# Patient Record
Sex: MALE | Race: WHITE | Employment: OTHER | URBAN - METROPOLITAN AREA
[De-identification: names, ages, dates, MRNs, and addresses within clinical notes are randomized per-mention and may not be internally consistent; named-entity substitution may affect disease eponyms.]

---

## 2019-01-01 ENCOUNTER — HOSPITAL ENCOUNTER (INPATIENT)
Age: 64
LOS: 5 days | Discharge: HOME | DRG: 419 | End: 2019-01-06
Attending: EMERGENCY MEDICINE | Admitting: SURGERY
Payer: COMMERCIAL

## 2019-01-01 ENCOUNTER — APPOINTMENT (OUTPATIENT)
Dept: CT IMAGING | Age: 64
DRG: 419 | End: 2019-01-01
Attending: EMERGENCY MEDICINE
Payer: COMMERCIAL

## 2019-01-01 DIAGNOSIS — K81.9 CHOLECYSTITIS: Primary | ICD-10-CM

## 2019-01-01 DIAGNOSIS — R10.13 ABDOMINAL PAIN, EPIGASTRIC: ICD-10-CM

## 2019-01-01 PROBLEM — K80.40 CALCULUS OF BILE DUCT WITH CHOLECYSTITIS: Status: ACTIVE | Noted: 2019-01-01

## 2019-01-01 LAB
ALBUMIN SERPL-MCNC: 3.7 G/DL (ref 3.5–5)
ALBUMIN/GLOB SERPL: 1 {RATIO} (ref 1.1–2.2)
ALP SERPL-CCNC: 260 U/L (ref 45–117)
ALT SERPL-CCNC: 1015 U/L (ref 12–78)
ANION GAP SERPL CALC-SCNC: 10 MMOL/L (ref 5–15)
AST SERPL-CCNC: 625 U/L (ref 15–37)
BASOPHILS # BLD: 0 K/UL (ref 0–0.1)
BASOPHILS NFR BLD: 0 % (ref 0–1)
BILIRUB SERPL-MCNC: 7.7 MG/DL (ref 0.2–1)
BUN SERPL-MCNC: 10 MG/DL (ref 6–20)
BUN/CREAT SERPL: 10 (ref 12–20)
CALCIUM SERPL-MCNC: 9.2 MG/DL (ref 8.5–10.1)
CHLORIDE SERPL-SCNC: 98 MMOL/L (ref 97–108)
CO2 SERPL-SCNC: 28 MMOL/L (ref 21–32)
COMMENT, HOLDF: NORMAL
CREAT SERPL-MCNC: 1.03 MG/DL (ref 0.7–1.3)
DIFFERENTIAL METHOD BLD: ABNORMAL
EOSINOPHIL # BLD: 0 K/UL (ref 0–0.4)
EOSINOPHIL NFR BLD: 0 % (ref 0–7)
ERYTHROCYTE [DISTWIDTH] IN BLOOD BY AUTOMATED COUNT: 12.5 % (ref 11.5–14.5)
GLOBULIN SER CALC-MCNC: 3.8 G/DL (ref 2–4)
GLUCOSE SERPL-MCNC: 154 MG/DL (ref 65–100)
HCT VFR BLD AUTO: 51.3 % (ref 36.6–50.3)
HGB BLD-MCNC: 17.2 G/DL (ref 12.1–17)
IMM GRANULOCYTES # BLD: 0.1 K/UL (ref 0–0.04)
IMM GRANULOCYTES NFR BLD AUTO: 1 % (ref 0–0.5)
LACTATE BLD-SCNC: 1.33 MMOL/L (ref 0.4–2)
LIPASE SERPL-CCNC: 80 U/L (ref 73–393)
LYMPHOCYTES # BLD: 0.6 K/UL (ref 0.8–3.5)
LYMPHOCYTES NFR BLD: 5 % (ref 12–49)
MCH RBC QN AUTO: 28.5 PG (ref 26–34)
MCHC RBC AUTO-ENTMCNC: 33.5 G/DL (ref 30–36.5)
MCV RBC AUTO: 84.9 FL (ref 80–99)
MONOCYTES # BLD: 0.7 K/UL (ref 0–1)
MONOCYTES NFR BLD: 6 % (ref 5–13)
NEUTS SEG # BLD: 10.3 K/UL (ref 1.8–8)
NEUTS SEG NFR BLD: 88 % (ref 32–75)
NRBC # BLD: 0 K/UL (ref 0–0.01)
NRBC BLD-RTO: 0 PER 100 WBC
PLATELET # BLD AUTO: 258 K/UL (ref 150–400)
PLATELET COMMENTS,PCOM: ABNORMAL
PMV BLD AUTO: 10.5 FL (ref 8.9–12.9)
POTASSIUM SERPL-SCNC: 3.8 MMOL/L (ref 3.5–5.1)
PROT SERPL-MCNC: 7.5 G/DL (ref 6.4–8.2)
RBC # BLD AUTO: 6.04 M/UL (ref 4.1–5.7)
RBC MORPH BLD: ABNORMAL
SAMPLES BEING HELD,HOLD: NORMAL
SODIUM SERPL-SCNC: 136 MMOL/L (ref 136–145)
TROPONIN I SERPL-MCNC: <0.05 NG/ML
WBC # BLD AUTO: 11.7 K/UL (ref 4.1–11.1)

## 2019-01-01 PROCEDURE — 80053 COMPREHEN METABOLIC PANEL: CPT

## 2019-01-01 PROCEDURE — 96375 TX/PRO/DX INJ NEW DRUG ADDON: CPT

## 2019-01-01 PROCEDURE — 96374 THER/PROPH/DIAG INJ IV PUSH: CPT

## 2019-01-01 PROCEDURE — 93005 ELECTROCARDIOGRAM TRACING: CPT

## 2019-01-01 PROCEDURE — 83690 ASSAY OF LIPASE: CPT

## 2019-01-01 PROCEDURE — 74011636320 HC RX REV CODE- 636/320: Performed by: RADIOLOGY

## 2019-01-01 PROCEDURE — 74177 CT ABD & PELVIS W/CONTRAST: CPT

## 2019-01-01 PROCEDURE — 74011250636 HC RX REV CODE- 250/636: Performed by: EMERGENCY MEDICINE

## 2019-01-01 PROCEDURE — 83605 ASSAY OF LACTIC ACID: CPT

## 2019-01-01 PROCEDURE — 65270000029 HC RM PRIVATE

## 2019-01-01 PROCEDURE — 74011000258 HC RX REV CODE- 258: Performed by: RADIOLOGY

## 2019-01-01 PROCEDURE — 96361 HYDRATE IV INFUSION ADD-ON: CPT

## 2019-01-01 PROCEDURE — 85025 COMPLETE CBC W/AUTO DIFF WBC: CPT

## 2019-01-01 PROCEDURE — 84484 ASSAY OF TROPONIN QUANT: CPT

## 2019-01-01 PROCEDURE — 99285 EMERGENCY DEPT VISIT HI MDM: CPT

## 2019-01-01 RX ORDER — SODIUM CHLORIDE 0.9 % (FLUSH) 0.9 %
10 SYRINGE (ML) INJECTION
Status: COMPLETED | OUTPATIENT
Start: 2019-01-01 | End: 2019-01-01

## 2019-01-01 RX ORDER — HYDROMORPHONE HYDROCHLORIDE 2 MG/ML
1 INJECTION, SOLUTION INTRAMUSCULAR; INTRAVENOUS; SUBCUTANEOUS
Status: DISCONTINUED | OUTPATIENT
Start: 2019-01-01 | End: 2019-01-03

## 2019-01-01 RX ORDER — MORPHINE SULFATE 2 MG/ML
4 INJECTION, SOLUTION INTRAMUSCULAR; INTRAVENOUS
Status: COMPLETED | OUTPATIENT
Start: 2019-01-01 | End: 2019-01-01

## 2019-01-01 RX ORDER — ONDANSETRON 2 MG/ML
4 INJECTION INTRAMUSCULAR; INTRAVENOUS
Status: COMPLETED | OUTPATIENT
Start: 2019-01-01 | End: 2019-01-01

## 2019-01-01 RX ORDER — SODIUM CHLORIDE, SODIUM LACTATE, POTASSIUM CHLORIDE, CALCIUM CHLORIDE 600; 310; 30; 20 MG/100ML; MG/100ML; MG/100ML; MG/100ML
200 INJECTION, SOLUTION INTRAVENOUS CONTINUOUS
Status: DISCONTINUED | OUTPATIENT
Start: 2019-01-01 | End: 2019-01-04

## 2019-01-01 RX ORDER — KETOROLAC TROMETHAMINE 30 MG/ML
15 INJECTION, SOLUTION INTRAMUSCULAR; INTRAVENOUS
Status: DISPENSED | OUTPATIENT
Start: 2019-01-01 | End: 2019-01-03

## 2019-01-01 RX ORDER — ONDANSETRON 2 MG/ML
4 INJECTION INTRAMUSCULAR; INTRAVENOUS
Status: DISCONTINUED | OUTPATIENT
Start: 2019-01-01 | End: 2019-01-06 | Stop reason: HOSPADM

## 2019-01-01 RX ADMIN — IOPAMIDOL 100 ML: 755 INJECTION, SOLUTION INTRAVENOUS at 22:52

## 2019-01-01 RX ADMIN — SODIUM CHLORIDE 1000 ML: 900 INJECTION, SOLUTION INTRAVENOUS at 21:44

## 2019-01-01 RX ADMIN — SODIUM CHLORIDE 100 ML: 900 INJECTION, SOLUTION INTRAVENOUS at 22:52

## 2019-01-01 RX ADMIN — ONDANSETRON 4 MG: 2 INJECTION INTRAMUSCULAR; INTRAVENOUS at 23:23

## 2019-01-01 RX ADMIN — MORPHINE SULFATE 4 MG: 2 INJECTION, SOLUTION INTRAMUSCULAR; INTRAVENOUS at 23:23

## 2019-01-01 RX ADMIN — Medication 10 ML: at 22:52

## 2019-01-01 RX ADMIN — SODIUM CHLORIDE 1000 ML: 900 INJECTION, SOLUTION INTRAVENOUS at 23:23

## 2019-01-02 ENCOUNTER — APPOINTMENT (OUTPATIENT)
Dept: MRI IMAGING | Age: 64
DRG: 419 | End: 2019-01-02
Attending: SURGERY
Payer: COMMERCIAL

## 2019-01-02 LAB
ALBUMIN SERPL-MCNC: 3.2 G/DL (ref 3.5–5)
ALBUMIN/GLOB SERPL: 1 {RATIO} (ref 1.1–2.2)
ALP SERPL-CCNC: 221 U/L (ref 45–117)
ALT SERPL-CCNC: 864 U/L (ref 12–78)
ANION GAP SERPL CALC-SCNC: 8 MMOL/L (ref 5–15)
AST SERPL-CCNC: 445 U/L (ref 15–37)
ATRIAL RATE: 125 BPM
BILIRUB SERPL-MCNC: 7.8 MG/DL (ref 0.2–1)
BUN SERPL-MCNC: 8 MG/DL (ref 6–20)
BUN/CREAT SERPL: 8 (ref 12–20)
CALCIUM SERPL-MCNC: 8.5 MG/DL (ref 8.5–10.1)
CALCULATED P AXIS, ECG09: 38 DEGREES
CALCULATED R AXIS, ECG10: 15 DEGREES
CALCULATED T AXIS, ECG11: 46 DEGREES
CEA SERPL-MCNC: 0.4 NG/ML
CHLORIDE SERPL-SCNC: 106 MMOL/L (ref 97–108)
CO2 SERPL-SCNC: 23 MMOL/L (ref 21–32)
CREAT SERPL-MCNC: 0.98 MG/DL (ref 0.7–1.3)
DIAGNOSIS, 93000: NORMAL
ERYTHROCYTE [DISTWIDTH] IN BLOOD BY AUTOMATED COUNT: 12.5 % (ref 11.5–14.5)
GLOBULIN SER CALC-MCNC: 3.3 G/DL (ref 2–4)
GLUCOSE SERPL-MCNC: 123 MG/DL (ref 65–100)
HCT VFR BLD AUTO: 44.5 % (ref 36.6–50.3)
HGB BLD-MCNC: 15.2 G/DL (ref 12.1–17)
MCH RBC QN AUTO: 29.1 PG (ref 26–34)
MCHC RBC AUTO-ENTMCNC: 34.2 G/DL (ref 30–36.5)
MCV RBC AUTO: 85.2 FL (ref 80–99)
NRBC # BLD: 0 K/UL (ref 0–0.01)
NRBC BLD-RTO: 0 PER 100 WBC
P-R INTERVAL, ECG05: 152 MS
PLATELET # BLD AUTO: 199 K/UL (ref 150–400)
PMV BLD AUTO: 10.3 FL (ref 8.9–12.9)
POTASSIUM SERPL-SCNC: 3.7 MMOL/L (ref 3.5–5.1)
PROT SERPL-MCNC: 6.5 G/DL (ref 6.4–8.2)
Q-T INTERVAL, ECG07: 306 MS
QRS DURATION, ECG06: 86 MS
QTC CALCULATION (BEZET), ECG08: 441 MS
RBC # BLD AUTO: 5.22 M/UL (ref 4.1–5.7)
SODIUM SERPL-SCNC: 137 MMOL/L (ref 136–145)
VENTRICULAR RATE, ECG03: 125 BPM
WBC # BLD AUTO: 9.2 K/UL (ref 4.1–11.1)

## 2019-01-02 PROCEDURE — 77030021566 MRI ABD W MRCP W WO CONT

## 2019-01-02 PROCEDURE — 74011250636 HC RX REV CODE- 250/636: Performed by: SURGERY

## 2019-01-02 PROCEDURE — 85027 COMPLETE CBC AUTOMATED: CPT

## 2019-01-02 PROCEDURE — 80053 COMPREHEN METABOLIC PANEL: CPT

## 2019-01-02 PROCEDURE — A9585 GADOBUTROL INJECTION: HCPCS | Performed by: SURGERY

## 2019-01-02 PROCEDURE — 65270000032 HC RM SEMIPRIVATE

## 2019-01-02 PROCEDURE — 36415 COLL VENOUS BLD VENIPUNCTURE: CPT

## 2019-01-02 PROCEDURE — 86301 IMMUNOASSAY TUMOR CA 19-9: CPT

## 2019-01-02 PROCEDURE — 82378 CARCINOEMBRYONIC ANTIGEN: CPT

## 2019-01-02 PROCEDURE — 74011000258 HC RX REV CODE- 258: Performed by: SURGERY

## 2019-01-02 RX ORDER — CLONIDINE HYDROCHLORIDE 0.2 MG/1
0.2 TABLET ORAL
Status: DISCONTINUED | OUTPATIENT
Start: 2019-01-02 | End: 2019-01-06 | Stop reason: HOSPADM

## 2019-01-02 RX ADMIN — PIPERACILLIN AND TAZOBACTAM 3.38 G: 3; .375 INJECTION, POWDER, FOR SOLUTION INTRAVENOUS at 17:36

## 2019-01-02 RX ADMIN — SODIUM CHLORIDE, SODIUM LACTATE, POTASSIUM CHLORIDE, AND CALCIUM CHLORIDE 125 ML/HR: 600; 310; 30; 20 INJECTION, SOLUTION INTRAVENOUS at 01:50

## 2019-01-02 RX ADMIN — KETOROLAC TROMETHAMINE 15 MG: 30 INJECTION, SOLUTION INTRAMUSCULAR at 01:51

## 2019-01-02 RX ADMIN — GADOBUTROL 10 ML: 604.72 INJECTION INTRAVENOUS at 19:02

## 2019-01-02 RX ADMIN — PIPERACILLIN AND TAZOBACTAM 3.38 G: 3; .375 INJECTION, POWDER, FOR SOLUTION INTRAVENOUS at 02:00

## 2019-01-02 RX ADMIN — HYDROMORPHONE HYDROCHLORIDE 1 MG: 2 INJECTION INTRAMUSCULAR; INTRAVENOUS; SUBCUTANEOUS at 23:58

## 2019-01-02 RX ADMIN — PIPERACILLIN AND TAZOBACTAM 3.38 G: 3; .375 INJECTION, POWDER, FOR SOLUTION INTRAVENOUS at 09:10

## 2019-01-02 NOTE — PROGRESS NOTES
Primary Nurse Genoveva Qureshi RN and Marlen Rudolph RN performed a dual skin assessment on this patient No impairment noted Ernesto score is 23

## 2019-01-02 NOTE — ED NOTES
Bedside and Verbal shift change report given to [place], RN (oncoming nurse) by Jarrett Clifton RN (offgoing nurse). Report included the following information SBAR, ED Summary, Intake/Output, MAR and Recent Results.

## 2019-01-02 NOTE — ED PROVIDER NOTES
66 y.o. male with past medical history significant for HTN who presents from home with chief complaint of abdominal pain. Pt reports intermittent 8/10 \"stabbing\" epigastric abdominal pain for 6 days accompanied by occasional \"burning\" mid-abdominal pain, both of which are worsened by eating. Pt also states he had an episode of dry heaves today. He notes he had gall bladder problems ~20 years ago, but denies prior cholecystectomy. Pt last ate an english muffin this morning and has drank 8 glasses of water through the day today. NKDA. Pt denies back pain and flank pain. There are no other acute medical concerns at this time. Note written by Rohan Hernandez, as dictated by Chastity Bustillos MD 9:48 PM 
 
 
 
  
 
Past Medical History:  
Diagnosis Date  Hypertension History reviewed. No pertinent surgical history. History reviewed. No pertinent family history. Social History Socioeconomic History  Marital status:  Spouse name: Not on file  Number of children: Not on file  Years of education: Not on file  Highest education level: Not on file Social Needs  Financial resource strain: Not on file  Food insecurity - worry: Not on file  Food insecurity - inability: Not on file  Transportation needs - medical: Not on file  Transportation needs - non-medical: Not on file Occupational History  Not on file Tobacco Use  Smoking status: Never Smoker  Smokeless tobacco: Never Used Substance and Sexual Activity  Alcohol use: Yes Comment: 5 times a week  Drug use: No  
 Sexual activity: Not on file Other Topics Concern  Not on file Social History Narrative  Not on file ALLERGIES: Patient has no known allergies. Review of Systems Respiratory: Negative for cough and shortness of breath. Cardiovascular: Negative for chest pain and palpitations. Gastrointestinal: Positive for abdominal pain and nausea. Negative for vomiting. Genitourinary: Negative for flank pain and hematuria. Musculoskeletal: Negative for back pain and neck pain. All other systems reviewed and are negative. Vitals:  
 01/01/19 2046 01/01/19 2137 BP:  (!) 182/101 Pulse: (!) 130 (!) 129 Resp:  25 Temp:  99.8 °F (37.7 °C) SpO2: 99% 94% Weight:  99.8 kg (220 lb) Height:  5' 10\" (1.778 m) Physical Exam  
Constitutional: He is oriented to person, place, and time. He appears well-developed and well-nourished. He appears ill. HENT:  
Head: Normocephalic and atraumatic. Eyes: Conjunctivae and EOM are normal. Pupils are equal, round, and reactive to light. No scleral icterus. Neck: Normal range of motion. Cardiovascular: Regular rhythm, normal heart sounds and intact distal pulses. Tachycardia present. No murmur heard. Pulmonary/Chest: Effort normal and breath sounds normal. No stridor. No respiratory distress. Abdominal: Soft. Bowel sounds are normal. There is tenderness in the right upper quadrant and epigastric area. There is no rebound and negative Main's sign. Musculoskeletal: Normal range of motion. He exhibits no edema or tenderness. Neurological: He is alert and oriented to person, place, and time. No cranial nerve deficit. Skin: Skin is warm and dry. He is not diaphoretic. Psychiatric: He has a normal mood and affect. Nursing note and vitals reviewed. MDM Number of Diagnoses or Management Options Abdominal pain, epigastric:  
Cholecystitis:  
Diagnosis management comments: Patient with upper abdominal pain - check labs and abdominal CT scan for possible pancreatitis vs diverticular disease vs other intra-abdominal process. Amount and/or Complexity of Data Reviewed Clinical lab tests: ordered and reviewed Tests in the radiology section of CPT®: ordered and reviewed Discuss the patient with other providers: yes (1145pm - General Surgery to admit for further eval and care) Procedures ED EKG interpretation: 
Rhythm: sinus tachycardia; Rate (approx.): 125; Axis: normal; ST/T wave: non-specific changes; Note written by Rohan Todd, as dictated by Ally Caldwell MD 9:05 PM 
 
 
 
  
 
 
VITALS:  
Patient Vitals for the past 8 hrs: 
 Temp Pulse Resp BP SpO2  
01/01/19 2137 99.8 °F (37.7 °C) (!) 129 25 (!) 182/101 94 % 01/01/19 2046  (!) 130   99 % Recent Results (from the past 24 hour(s)) EKG, 12 LEAD, INITIAL Collection Time: 01/01/19  9:03 PM  
Result Value Ref Range Ventricular Rate 125 BPM  
 Atrial Rate 125 BPM  
 P-R Interval 152 ms QRS Duration 86 ms  
 Q-T Interval 306 ms QTC Calculation (Bezet) 441 ms Calculated P Axis 38 degrees Calculated R Axis 15 degrees Calculated T Axis 46 degrees Diagnosis Sinus tachycardia Nonspecific ST abnormality No previous ECGs available SAMPLES BEING HELD Collection Time: 01/01/19  9:10 PM  
Result Value Ref Range SAMPLES BEING HELD 1red,1blu,1lav,1pst   
 COMMENT Add-on orders for these samples will be processed based on acceptable specimen integrity and analyte stability, which may vary by analyte. CBC WITH AUTOMATED DIFF Collection Time: 01/01/19  9:10 PM  
Result Value Ref Range WBC 11.7 (H) 4.1 - 11.1 K/uL  
 RBC 6.04 (H) 4.10 - 5.70 M/uL  
 HGB 17.2 (H) 12.1 - 17.0 g/dL HCT 51.3 (H) 36.6 - 50.3 % MCV 84.9 80.0 - 99.0 FL  
 MCH 28.5 26.0 - 34.0 PG  
 MCHC 33.5 30.0 - 36.5 g/dL  
 RDW 12.5 11.5 - 14.5 % PLATELET 101 550 - 879 K/uL MPV 10.5 8.9 - 12.9 FL  
 NRBC 0.0 0  WBC ABSOLUTE NRBC 0.00 0.00 - 0.01 K/uL NEUTROPHILS 88 (H) 32 - 75 % LYMPHOCYTES 5 (L) 12 - 49 % MONOCYTES 6 5 - 13 % EOSINOPHILS 0 0 - 7 % BASOPHILS 0 0 - 1 % IMMATURE GRANULOCYTES 1 (H) 0.0 - 0.5 % ABS. NEUTROPHILS 10.3 (H) 1.8 - 8.0 K/UL  
 ABS. LYMPHOCYTES 0.6 (L) 0.8 - 3.5 K/UL  
 ABS. MONOCYTES 0.7 0.0 - 1.0 K/UL  
 ABS. EOSINOPHILS 0.0 0.0 - 0.4 K/UL  
 ABS. BASOPHILS 0.0 0.0 - 0.1 K/UL  
 ABS. IMM. GRANS. 0.1 (H) 0.00 - 0.04 K/UL  
 DF SMEAR SCANNED    
 PLATELET COMMENTS Large Platelets RBC COMMENTS NORMOCYTIC, NORMOCHROMIC METABOLIC PANEL, COMPREHENSIVE Collection Time: 01/01/19  9:10 PM  
Result Value Ref Range Sodium 136 136 - 145 mmol/L Potassium 3.8 3.5 - 5.1 mmol/L Chloride 98 97 - 108 mmol/L  
 CO2 28 21 - 32 mmol/L Anion gap 10 5 - 15 mmol/L Glucose 154 (H) 65 - 100 mg/dL BUN 10 6 - 20 MG/DL Creatinine 1.03 0.70 - 1.30 MG/DL  
 BUN/Creatinine ratio 10 (L) 12 - 20 GFR est AA >60 >60 ml/min/1.73m2 GFR est non-AA >60 >60 ml/min/1.73m2 Calcium 9.2 8.5 - 10.1 MG/DL Bilirubin, total 7.7 (H) 0.2 - 1.0 MG/DL  
 ALT (SGPT) 1,015 (H) 12 - 78 U/L  
 AST (SGOT) 625 (H) 15 - 37 U/L Alk. phosphatase 260 (H) 45 - 117 U/L Protein, total 7.5 6.4 - 8.2 g/dL Albumin 3.7 3.5 - 5.0 g/dL Globulin 3.8 2.0 - 4.0 g/dL A-G Ratio 1.0 (L) 1.1 - 2.2 LIPASE Collection Time: 01/01/19  9:10 PM  
Result Value Ref Range Lipase 80 73 - 393 U/L  
TROPONIN I Collection Time: 01/01/19  9:10 PM  
Result Value Ref Range Troponin-I, Qt. <0.05 <0.05 ng/mL POC LACTIC ACID Collection Time: 01/01/19 10:07 PM  
Result Value Ref Range Lactic Acid (POC) 1.33 0.40 - 2.00 mmol/L Ct Abd Pelv W Cont Result Date: 1/1/2019 EXAM: CT ABD PELV W CONT INDICATION: Upper abdominal pain for 6 days is exacerbated by eating. Mild leukocytosis. Elevated alkaline phosphatase, AST, SGPT, and serum bilirubin. Normal lipase. No abdominal surgery. COMPARISON: None. CONTRAST: 100 mL of Isovue-370. TECHNIQUE: Following the uneventful intravenous administration of contrast, thin axial images were obtained through the abdomen and pelvis.  Coronal and sagittal reconstructions were generated. Oral contrast was not administered. CT dose reduction was achieved through use of a standardized protocol tailored for this examination and automatic exposure control for dose modulation. Adaptive statistical iterative reconstruction (ASIR) was utilized. FINDINGS: LUNG BASES: Right lung base subsegmental atelectasis. INCIDENTALLY IMAGED HEART AND MEDIASTINUM: Unremarkable. LIVER: Heterogeneous hypoattenuation most likely represents hepatic steatosis. Smooth surface. Normal size. GALLBLADDER: Incomplete distention. Heterogeneous mural thickening. Proximal CBD measures 13 mm. Distal CBD measures 8 mm. Subtle mural thickening of the wall of the CBD. Ill-defined ampulla and distal CBD. SPLEEN: Spleen measures 14 cm. No mass. PANCREAS: Mild atrophy. No inflammation or mass. Pancreatic duct is not dilated. ADRENALS: Unremarkable. KIDNEYS: Small bilateral nonspecific cysts. No solid renal mass or hydronephrosis. STOMACH: Nondistended. SMALL BOWEL: No dilatation or wall thickening. COLON: No dilatation or wall thickening. APPENDIX: Unremarkable. PERITONEUM: No ascites or pneumoperitoneum. RETROPERITONEUM: No lymphadenopathy or aortic aneurysm. REPRODUCTIVE ORGANS: Heterogeneous mild prostatomegaly contains calcifications. URINARY BLADDER: No mass or calculus. BONES: No destructive bone lesion. ADDITIONAL COMMENTS: Right inguinal hernia contains fat. Small left inguinal hernia contains fat. IMPRESSION: 1. Despite nondistention, there is likely acute cholecystitis. CBD dilatation and elevated serum bilirubin suggest choledocholithiasis. No pancreatic dilatation to suggest ampullary mass. Consider ERCP or MRCP. 2. Right greater than left fat-containing inguinal hernias. 3. Probable hepatic steatosis. Nonspecific splenomegaly.

## 2019-01-02 NOTE — CONSULTS
Chief Complaint:  Abdominal pain with jaundice    HPI:  67 yo man with hx HTN presented to ED with abdominal pain. Pt reported pain developed 6 days ago. Pain has been in epigastric radiating toward umbilicus. Pt reported nausea/vomiting and dry heaves. He denied any fever or chills. Pt did not dark color urine. He reported about 10 pounds weight loss over past 6 days but attributed it to nausea/vomiting and unable to eat. CT scan showed dilated bile duct likely from gallstones. Past Medical History:   Diagnosis Date    Hypertension        History reviewed. No pertinent surgical history. No current facility-administered medications on file prior to encounter. No current outpatient medications on file prior to encounter.        No Known Allergies    Review of Systems - General ROS: negative  Psychological ROS: negative  Respiratory ROS: negative  Cardiovascular ROS: negative  Gastrointestinal ROS: positive for - abdominal pain and nausea/vomiting    Visit Vitals  /72 (BP 1 Location: Right arm, BP Patient Position: At rest)   Pulse 99   Temp 100.1 °F (37.8 °C)   Resp 23   Ht 5' 10\" (1.778 m)   Wt 220 lb (99.8 kg)   SpO2 93%   BMI 31.57 kg/m²         Physical Exam:    Gen:  NAD  Pulm:  Unlabored  Abd:  S/moderately distended/mild TTP in epigastric area    Recent Results (from the past 24 hour(s))   EKG, 12 LEAD, INITIAL    Collection Time: 01/01/19  9:03 PM   Result Value Ref Range    Ventricular Rate 125 BPM    Atrial Rate 125 BPM    P-R Interval 152 ms    QRS Duration 86 ms    Q-T Interval 306 ms    QTC Calculation (Bezet) 441 ms    Calculated P Axis 38 degrees    Calculated R Axis 15 degrees    Calculated T Axis 46 degrees    Diagnosis       Sinus tachycardia  Nonspecific ST abnormality  No previous ECGs available     SAMPLES BEING HELD    Collection Time: 01/01/19  9:10 PM   Result Value Ref Range    SAMPLES BEING HELD 1red,1blu,1lav,1pst     COMMENT        Add-on orders for these samples will be processed based on acceptable specimen integrity and analyte stability, which may vary by analyte. CBC WITH AUTOMATED DIFF    Collection Time: 01/01/19  9:10 PM   Result Value Ref Range    WBC 11.7 (H) 4.1 - 11.1 K/uL    RBC 6.04 (H) 4.10 - 5.70 M/uL    HGB 17.2 (H) 12.1 - 17.0 g/dL    HCT 51.3 (H) 36.6 - 50.3 %    MCV 84.9 80.0 - 99.0 FL    MCH 28.5 26.0 - 34.0 PG    MCHC 33.5 30.0 - 36.5 g/dL    RDW 12.5 11.5 - 14.5 %    PLATELET 123 574 - 021 K/uL    MPV 10.5 8.9 - 12.9 FL    NRBC 0.0 0  WBC    ABSOLUTE NRBC 0.00 0.00 - 0.01 K/uL    NEUTROPHILS 88 (H) 32 - 75 %    LYMPHOCYTES 5 (L) 12 - 49 %    MONOCYTES 6 5 - 13 %    EOSINOPHILS 0 0 - 7 %    BASOPHILS 0 0 - 1 %    IMMATURE GRANULOCYTES 1 (H) 0.0 - 0.5 %    ABS. NEUTROPHILS 10.3 (H) 1.8 - 8.0 K/UL    ABS. LYMPHOCYTES 0.6 (L) 0.8 - 3.5 K/UL    ABS. MONOCYTES 0.7 0.0 - 1.0 K/UL    ABS. EOSINOPHILS 0.0 0.0 - 0.4 K/UL    ABS. BASOPHILS 0.0 0.0 - 0.1 K/UL    ABS. IMM. GRANS. 0.1 (H) 0.00 - 0.04 K/UL    DF SMEAR SCANNED      PLATELET COMMENTS Large Platelets      RBC COMMENTS NORMOCYTIC, NORMOCHROMIC     METABOLIC PANEL, COMPREHENSIVE    Collection Time: 01/01/19  9:10 PM   Result Value Ref Range    Sodium 136 136 - 145 mmol/L    Potassium 3.8 3.5 - 5.1 mmol/L    Chloride 98 97 - 108 mmol/L    CO2 28 21 - 32 mmol/L    Anion gap 10 5 - 15 mmol/L    Glucose 154 (H) 65 - 100 mg/dL    BUN 10 6 - 20 MG/DL    Creatinine 1.03 0.70 - 1.30 MG/DL    BUN/Creatinine ratio 10 (L) 12 - 20      GFR est AA >60 >60 ml/min/1.73m2    GFR est non-AA >60 >60 ml/min/1.73m2    Calcium 9.2 8.5 - 10.1 MG/DL    Bilirubin, total 7.7 (H) 0.2 - 1.0 MG/DL    ALT (SGPT) 1,015 (H) 12 - 78 U/L    AST (SGOT) 625 (H) 15 - 37 U/L    Alk.  phosphatase 260 (H) 45 - 117 U/L    Protein, total 7.5 6.4 - 8.2 g/dL    Albumin 3.7 3.5 - 5.0 g/dL    Globulin 3.8 2.0 - 4.0 g/dL    A-G Ratio 1.0 (L) 1.1 - 2.2     LIPASE    Collection Time: 01/01/19  9:10 PM   Result Value Ref Range    Lipase 80 73 - 393 U/L   TROPONIN I    Collection Time: 01/01/19  9:10 PM   Result Value Ref Range    Troponin-I, Qt. <0.05 <0.05 ng/mL   POC LACTIC ACID    Collection Time: 01/01/19 10:07 PM   Result Value Ref Range    Lactic Acid (POC) 1.33 0.40 - 2.00 mmol/L       AP:  67 yo man with biliary obstruction presumably from gallstone    - Biliary obstruction:  Will obtain MRCP to evaluate for gallstone versus mass causing obstruction  - Clear liquids  - MRCP in am  - Zosyn antibiotic  - Further plans pending MRCP

## 2019-01-02 NOTE — PROGRESS NOTES
General Surgery Daily Progress Note Admit Date: 2019 Post-Operative Day: * No surgery found * from * No surgery found * Subjective:  
 
Last 24 hrs: Pt is doing well w/o c/o pain. He was last dosed w/ pain med at 2am.  He's amazed he isn't having pain. Not OOB much Objective:  
 
Blood pressure 149/88, pulse 90, temperature 98.4 °F (36.9 °C), resp. rate 20, height 5' 10\" (1.778 m), weight 220 lb (99.8 kg), SpO2 90 %. Temp (24hrs), Av.4 °F (37.4 °C), Min:98.4 °F (36.9 °C), Max:100.1 °F (37.8 °C) 
 
 
_____________________ Physical Exam:    
Alert and Oriented, x3, in no acute distress. Cardiovascular: RRR, no peripheral edema Abdomen: soft, nl BS, NT Assessment:  
Active Problems: 
  Calculus of bile duct with cholecystitis (2019) Plan: MRCP this am 
Surgical plans therafter Keep NPO Cont zosyn Data Review: 
 
Recent Labs 197 19 WBC 9.2 11.7* HGB 15.2 17.2* HCT 44.5 51.3*  
 258 Recent Labs 19 
0447 19  136  
K 3.7 3.8  98 CO2 23 28 * 154* BUN 8 10 CREA 0.98 1.03  
CA 8.5 9.2 ALB 3.2* 3.7 SGOT 445* 625* * 1,015* Recent Labs 190 LPSE 80  
 
 
 
 
______________________ Medications: 
 
Current Facility-Administered Medications Medication Dose Route Frequency  cloNIDine HCl (CATAPRES) tablet 0.2 mg  0.2 mg Oral Q4H PRN  
 lactated Ringers infusion  125 mL/hr IntraVENous CONTINUOUS  
 ondansetron (ZOFRAN) injection 4 mg  4 mg IntraVENous Q4H PRN  
 HYDROmorphone (DILAUDID) injection 1 mg  1 mg IntraVENous Q3H PRN  piperacillin-tazobactam (ZOSYN) 3.375 g in 0.9% sodium chloride (MBP/ADV) 100 mL  3.375 g IntraVENous Q8H  
 ketorolac (TORADOL) injection 15 mg  15 mg IntraVENous Q6H PRN Mallorie Nieto, NP 
2019 ADDENDUM:  Aramis Padilla MD 
Pt seen and examined. He denied any abdominal pain, nausea or vomiting. Awaiting MRCP. If stone causing biliary obstruction then will ask GI consult for ERCP. NPO till after MRCP.   Labs in am

## 2019-01-02 NOTE — ED TRIAGE NOTES
Pt arrives via personal vehicle from home for c/o stabbing upper abd pain that started about 6 days ago. Pt reports symptoms are worse when he eats. +dry heaving.

## 2019-01-02 NOTE — ROUTINE PROCESS
TRANSFER - OUT REPORT: 
 
Verbal report given to Rica Booker RN(name) on [employer] [facility]  being transferred to [address] [place] [address](unit) for routine progression of care Report consisted of patients Situation, Background, Assessment and  
Recommendations(SBAR). Information from the following report(s) SBAR, ED Summary, Intake/Output, MAR, Recent Results and Cardiac Rhythm NSR was reviewed with the receiving nurse. Lines:  
Peripheral IV 01/01/19 Left Antecubital (Active) Site Assessment Clean, dry, & intact 1/1/2019  9:08 PM  
Phlebitis Assessment 0 1/1/2019  9:08 PM  
Infiltration Assessment 0 1/1/2019  9:08 PM  
Dressing Status Clean, dry, & intact 1/1/2019  9:08 PM  
Dressing Type Transparent 1/1/2019  9:08 PM  
Hub Color/Line Status Pink;Capped;Flushed;Patent 1/1/2019  9:08 PM  
Action Taken Blood drawn 1/1/2019  9:08 PM  
  
 
Opportunity for questions and clarification was provided. Patient transported with: 
 transport

## 2019-01-02 NOTE — PROGRESS NOTES
Reason for Admission:   Calculus of the bile duct with cholecystitis RRAT Score:       3 Plan for utilizing home health:   N/A Likelihood of Readmission:   Low Transition of Care Plan: 
CM met with patient. Patient lives with his wife. They have 2 adult daughters out of the household. Patient reports good family /social support. Patient was ambulatory prior to admission and expects return to independent functioning. Patient confirmed health insurance and prescription coverage. Transport at discharge will be provided by family. Patient confirmed that he does not have a PCP. CM provided the physician list for the [address] [place] [address] as means of facilitating patient selection of a PCP. Patient presently anticipates MCRP test to be followed by a determination regarding need for surgery. CM to follow for discharge planning needs.

## 2019-01-02 NOTE — H&P
Chief Complaint:  Abdominal pain with jaundice HPI:  67 yo man with hx HTN presented to ED with abdominal pain. Pt reported pain developed 6 days ago. Pain has been in epigastric radiating toward umbilicus. Pt reported nausea/vomiting and dry heaves. He denied any fever or chills. Pt did not dark color urine. He reported about 10 pounds weight loss over past 6 days but attributed it to nausea/vomiting and unable to eat. CT scan showed dilated bile duct likely from gallstones. Past Medical History:  
Diagnosis Date  Hypertension History reviewed. No pertinent surgical history. No current facility-administered medications on file prior to encounter. No current outpatient medications on file prior to encounter. No Known Allergies Review of Systems - General ROS: negative Psychological ROS: negative Respiratory ROS: negative Cardiovascular ROS: negative Gastrointestinal ROS: positive for - abdominal pain and nausea/vomiting Visit Vitals /90 (BP 1 Location: Right arm, BP Patient Position: At rest) Pulse (!) 109 Temp 99.1 °F (37.3 °C) Resp 20 Ht 5' 10\" (1.778 m) Wt 220 lb (99.8 kg) SpO2 92% BMI 31.57 kg/m² Physical Exam:   
Gen:  NAD Pulm:  Unlabored Abd:  S/moderately distended/mild TTP in epigastric area Recent Results (from the past 24 hour(s)) EKG, 12 LEAD, INITIAL Collection Time: 01/01/19  9:03 PM  
Result Value Ref Range Ventricular Rate 125 BPM  
 Atrial Rate 125 BPM  
 P-R Interval 152 ms QRS Duration 86 ms  
 Q-T Interval 306 ms QTC Calculation (Bezet) 441 ms Calculated P Axis 38 degrees Calculated R Axis 15 degrees Calculated T Axis 46 degrees Diagnosis Sinus tachycardia Nonspecific ST abnormality No previous ECGs available SAMPLES BEING HELD Collection Time: 01/01/19  9:10 PM  
Result Value Ref Range SAMPLES BEING HELD 1red,1blu,1lav,1pst   
 COMMENT Add-on orders for these samples will be processed based on acceptable specimen integrity and analyte stability, which may vary by analyte. CBC WITH AUTOMATED DIFF Collection Time: 01/01/19  9:10 PM  
Result Value Ref Range WBC 11.7 (H) 4.1 - 11.1 K/uL  
 RBC 6.04 (H) 4.10 - 5.70 M/uL  
 HGB 17.2 (H) 12.1 - 17.0 g/dL HCT 51.3 (H) 36.6 - 50.3 % MCV 84.9 80.0 - 99.0 FL  
 MCH 28.5 26.0 - 34.0 PG  
 MCHC 33.5 30.0 - 36.5 g/dL  
 RDW 12.5 11.5 - 14.5 % PLATELET 675 101 - 464 K/uL MPV 10.5 8.9 - 12.9 FL  
 NRBC 0.0 0  WBC ABSOLUTE NRBC 0.00 0.00 - 0.01 K/uL NEUTROPHILS 88 (H) 32 - 75 % LYMPHOCYTES 5 (L) 12 - 49 % MONOCYTES 6 5 - 13 % EOSINOPHILS 0 0 - 7 % BASOPHILS 0 0 - 1 % IMMATURE GRANULOCYTES 1 (H) 0.0 - 0.5 % ABS. NEUTROPHILS 10.3 (H) 1.8 - 8.0 K/UL  
 ABS. LYMPHOCYTES 0.6 (L) 0.8 - 3.5 K/UL  
 ABS. MONOCYTES 0.7 0.0 - 1.0 K/UL  
 ABS. EOSINOPHILS 0.0 0.0 - 0.4 K/UL  
 ABS. BASOPHILS 0.0 0.0 - 0.1 K/UL  
 ABS. IMM. GRANS. 0.1 (H) 0.00 - 0.04 K/UL  
 DF SMEAR SCANNED    
 PLATELET COMMENTS Large Platelets RBC COMMENTS NORMOCYTIC, NORMOCHROMIC METABOLIC PANEL, COMPREHENSIVE Collection Time: 01/01/19  9:10 PM  
Result Value Ref Range Sodium 136 136 - 145 mmol/L Potassium 3.8 3.5 - 5.1 mmol/L Chloride 98 97 - 108 mmol/L  
 CO2 28 21 - 32 mmol/L Anion gap 10 5 - 15 mmol/L Glucose 154 (H) 65 - 100 mg/dL BUN 10 6 - 20 MG/DL Creatinine 1.03 0.70 - 1.30 MG/DL  
 BUN/Creatinine ratio 10 (L) 12 - 20 GFR est AA >60 >60 ml/min/1.73m2 GFR est non-AA >60 >60 ml/min/1.73m2 Calcium 9.2 8.5 - 10.1 MG/DL Bilirubin, total 7.7 (H) 0.2 - 1.0 MG/DL  
 ALT (SGPT) 1,015 (H) 12 - 78 U/L  
 AST (SGOT) 625 (H) 15 - 37 U/L Alk. phosphatase 260 (H) 45 - 117 U/L Protein, total 7.5 6.4 - 8.2 g/dL Albumin 3.7 3.5 - 5.0 g/dL Globulin 3.8 2.0 - 4.0 g/dL A-G Ratio 1.0 (L) 1.1 - 2.2 LIPASE  Collection Time: 01/01/19  9:10 PM  
 Result Value Ref Range Lipase 80 73 - 393 U/L  
TROPONIN I Collection Time: 01/01/19  9:10 PM  
Result Value Ref Range Troponin-I, Qt. <0.05 <0.05 ng/mL POC LACTIC ACID Collection Time: 01/01/19 10:07 PM  
Result Value Ref Range Lactic Acid (POC) 1.33 0.40 - 2.00 mmol/L  
 
 
AP:  67 yo man with biliary obstruction presumably from gallstone - Biliary obstruction:  Will obtain MRCP to evaluate for gallstone versus mass causing obstruction - Clear liquids - MRCP in am 
- Zosyn antibiotic - Further plans pending MRCP

## 2019-01-03 ENCOUNTER — ANESTHESIA (OUTPATIENT)
Dept: ENDOSCOPY | Age: 64
DRG: 419 | End: 2019-01-03
Payer: COMMERCIAL

## 2019-01-03 ENCOUNTER — ANESTHESIA EVENT (OUTPATIENT)
Dept: ENDOSCOPY | Age: 64
DRG: 419 | End: 2019-01-03
Payer: COMMERCIAL

## 2019-01-03 ENCOUNTER — APPOINTMENT (OUTPATIENT)
Dept: GENERAL RADIOLOGY | Age: 64
DRG: 419 | End: 2019-01-03
Attending: INTERNAL MEDICINE
Payer: COMMERCIAL

## 2019-01-03 LAB
ALBUMIN SERPL-MCNC: 2.8 G/DL (ref 3.5–5)
ALBUMIN/GLOB SERPL: 0.9 {RATIO} (ref 1.1–2.2)
ALP SERPL-CCNC: 196 U/L (ref 45–117)
ALT SERPL-CCNC: 671 U/L (ref 12–78)
ANION GAP SERPL CALC-SCNC: 7 MMOL/L (ref 5–15)
AST SERPL-CCNC: 261 U/L (ref 15–37)
BILIRUB SERPL-MCNC: 4.9 MG/DL (ref 0.2–1)
BUN SERPL-MCNC: 10 MG/DL (ref 6–20)
BUN/CREAT SERPL: 12 (ref 12–20)
CALCIUM SERPL-MCNC: 8.8 MG/DL (ref 8.5–10.1)
CANCER AG19-9 SERPL-ACNC: 73 U/ML (ref 0–35)
CHLORIDE SERPL-SCNC: 106 MMOL/L (ref 97–108)
CO2 SERPL-SCNC: 26 MMOL/L (ref 21–32)
CREAT SERPL-MCNC: 0.82 MG/DL (ref 0.7–1.3)
ERYTHROCYTE [DISTWIDTH] IN BLOOD BY AUTOMATED COUNT: 12.6 % (ref 11.5–14.5)
GLOBULIN SER CALC-MCNC: 3.1 G/DL (ref 2–4)
GLUCOSE SERPL-MCNC: 87 MG/DL (ref 65–100)
HCT VFR BLD AUTO: 41.6 % (ref 36.6–50.3)
HGB BLD-MCNC: 14.1 G/DL (ref 12.1–17)
LIPASE SERPL-CCNC: 111 U/L (ref 73–393)
MCH RBC QN AUTO: 29 PG (ref 26–34)
MCHC RBC AUTO-ENTMCNC: 33.9 G/DL (ref 30–36.5)
MCV RBC AUTO: 85.4 FL (ref 80–99)
NRBC # BLD: 0 K/UL (ref 0–0.01)
NRBC BLD-RTO: 0 PER 100 WBC
PLATELET # BLD AUTO: 173 K/UL (ref 150–400)
PMV BLD AUTO: 10.5 FL (ref 8.9–12.9)
POTASSIUM SERPL-SCNC: 3.8 MMOL/L (ref 3.5–5.1)
PROT SERPL-MCNC: 5.9 G/DL (ref 6.4–8.2)
RBC # BLD AUTO: 4.87 M/UL (ref 4.1–5.7)
SODIUM SERPL-SCNC: 139 MMOL/L (ref 136–145)
WBC # BLD AUTO: 3.9 K/UL (ref 4.1–11.1)

## 2019-01-03 PROCEDURE — 77030012596 HC SPHNTOM BILI BSC -E: Performed by: INTERNAL MEDICINE

## 2019-01-03 PROCEDURE — 65270000032 HC RM SEMIPRIVATE

## 2019-01-03 PROCEDURE — 76060000033 HC ANESTHESIA 1 TO 1.5 HR: Performed by: INTERNAL MEDICINE

## 2019-01-03 PROCEDURE — 0FD98ZX EXTRACTION OF COMMON BILE DUCT, VIA NATURAL OR ARTIFICIAL OPENING ENDOSCOPIC, DIAGNOSTIC: ICD-10-PCS | Performed by: INTERNAL MEDICINE

## 2019-01-03 PROCEDURE — 77030006969 HC BSKT BILI RTVR BSC -D: Performed by: INTERNAL MEDICINE

## 2019-01-03 PROCEDURE — 74011250637 HC RX REV CODE- 250/637: Performed by: SURGERY

## 2019-01-03 PROCEDURE — 74011636320 HC RX REV CODE- 636/320

## 2019-01-03 PROCEDURE — 76040000008: Performed by: INTERNAL MEDICINE

## 2019-01-03 PROCEDURE — 0FC98ZZ EXTIRPATION OF MATTER FROM COMMON BILE DUCT, VIA NATURAL OR ARTIFICIAL OPENING ENDOSCOPIC: ICD-10-PCS | Performed by: INTERNAL MEDICINE

## 2019-01-03 PROCEDURE — 77030007288 HC DEV LOK BILI BSC -A: Performed by: INTERNAL MEDICINE

## 2019-01-03 PROCEDURE — 85027 COMPLETE CBC AUTOMATED: CPT

## 2019-01-03 PROCEDURE — 83690 ASSAY OF LIPASE: CPT

## 2019-01-03 PROCEDURE — 74011250636 HC RX REV CODE- 250/636

## 2019-01-03 PROCEDURE — 74011000258 HC RX REV CODE- 258: Performed by: SURGERY

## 2019-01-03 PROCEDURE — 88112 CYTOPATH CELL ENHANCE TECH: CPT

## 2019-01-03 PROCEDURE — 80053 COMPREHEN METABOLIC PANEL: CPT

## 2019-01-03 PROCEDURE — 77030008684 HC TU ET CUF COVD -B: Performed by: ANESTHESIOLOGY

## 2019-01-03 PROCEDURE — 77030009038 HC CATH BILI STN RTVR BSC -C: Performed by: INTERNAL MEDICINE

## 2019-01-03 PROCEDURE — 77030026438 HC STYL ET INTUB CARD -A: Performed by: ANESTHESIOLOGY

## 2019-01-03 PROCEDURE — 36415 COLL VENOUS BLD VENIPUNCTURE: CPT

## 2019-01-03 PROCEDURE — 74011250636 HC RX REV CODE- 250/636: Performed by: SURGERY

## 2019-01-03 PROCEDURE — C2625 STENT, NON-COR, TEM W/DEL SY: HCPCS | Performed by: INTERNAL MEDICINE

## 2019-01-03 PROCEDURE — 0F798DZ DILATION OF COMMON BILE DUCT WITH INTRALUMINAL DEVICE, VIA NATURAL OR ARTIFICIAL OPENING ENDOSCOPIC: ICD-10-PCS | Performed by: INTERNAL MEDICINE

## 2019-01-03 PROCEDURE — 77030036933 HC BRSH RX CYTO WREGD BSC -C: Performed by: INTERNAL MEDICINE

## 2019-01-03 DEVICE — BILIARY STENT WITH NAVIFLEXTM RX DELIVERY SYSTEM
Type: IMPLANTABLE DEVICE | Site: BILE DUCT | Status: FUNCTIONAL
Brand: ADVANIX™ BILIARY

## 2019-01-03 RX ORDER — FLUMAZENIL 0.1 MG/ML
0.2 INJECTION INTRAVENOUS
Status: DISCONTINUED | OUTPATIENT
Start: 2019-01-03 | End: 2019-01-03 | Stop reason: HOSPADM

## 2019-01-03 RX ORDER — LIDOCAINE HYDROCHLORIDE 20 MG/ML
INJECTION, SOLUTION EPIDURAL; INFILTRATION; INTRACAUDAL; PERINEURAL AS NEEDED
Status: DISCONTINUED | OUTPATIENT
Start: 2019-01-03 | End: 2019-01-03 | Stop reason: HOSPADM

## 2019-01-03 RX ORDER — FENTANYL CITRATE 50 UG/ML
INJECTION, SOLUTION INTRAMUSCULAR; INTRAVENOUS
Status: COMPLETED
Start: 2019-01-03 | End: 2019-01-03

## 2019-01-03 RX ORDER — PROPOFOL 10 MG/ML
INJECTION, EMULSION INTRAVENOUS AS NEEDED
Status: DISCONTINUED | OUTPATIENT
Start: 2019-01-03 | End: 2019-01-03 | Stop reason: HOSPADM

## 2019-01-03 RX ORDER — ATROPINE SULFATE 0.1 MG/ML
0.5 INJECTION INTRAVENOUS
Status: DISCONTINUED | OUTPATIENT
Start: 2019-01-03 | End: 2019-01-03 | Stop reason: HOSPADM

## 2019-01-03 RX ORDER — DEXAMETHASONE SODIUM PHOSPHATE 4 MG/ML
INJECTION, SOLUTION INTRA-ARTICULAR; INTRALESIONAL; INTRAMUSCULAR; INTRAVENOUS; SOFT TISSUE AS NEEDED
Status: DISCONTINUED | OUTPATIENT
Start: 2019-01-03 | End: 2019-01-03 | Stop reason: HOSPADM

## 2019-01-03 RX ORDER — SODIUM CHLORIDE 9 MG/ML
100 INJECTION, SOLUTION INTRAVENOUS CONTINUOUS
Status: DISPENSED | OUTPATIENT
Start: 2019-01-03 | End: 2019-01-03

## 2019-01-03 RX ORDER — FENTANYL CITRATE 50 UG/ML
INJECTION, SOLUTION INTRAMUSCULAR; INTRAVENOUS AS NEEDED
Status: DISCONTINUED | OUTPATIENT
Start: 2019-01-03 | End: 2019-01-03 | Stop reason: HOSPADM

## 2019-01-03 RX ORDER — DOCUSATE SODIUM 100 MG/1
100 CAPSULE, LIQUID FILLED ORAL 2 TIMES DAILY
Status: DISCONTINUED | OUTPATIENT
Start: 2019-01-03 | End: 2019-01-06 | Stop reason: HOSPADM

## 2019-01-03 RX ORDER — DEXTROMETHORPHAN/PSEUDOEPHED 2.5-7.5/.8
1.2 DROPS ORAL
Status: DISCONTINUED | OUTPATIENT
Start: 2019-01-03 | End: 2019-01-03 | Stop reason: HOSPADM

## 2019-01-03 RX ORDER — SODIUM CHLORIDE 0.9 % (FLUSH) 0.9 %
SYRINGE (ML) INJECTION
Status: DISPENSED
Start: 2019-01-03 | End: 2019-01-04

## 2019-01-03 RX ORDER — NALOXONE HYDROCHLORIDE 0.4 MG/ML
0.4 INJECTION, SOLUTION INTRAMUSCULAR; INTRAVENOUS; SUBCUTANEOUS
Status: DISCONTINUED | OUTPATIENT
Start: 2019-01-03 | End: 2019-01-03 | Stop reason: HOSPADM

## 2019-01-03 RX ORDER — EPINEPHRINE 0.1 MG/ML
1 INJECTION INTRACARDIAC; INTRAVENOUS
Status: DISCONTINUED | OUTPATIENT
Start: 2019-01-03 | End: 2019-01-03 | Stop reason: HOSPADM

## 2019-01-03 RX ORDER — DIPHENHYDRAMINE HYDROCHLORIDE 50 MG/ML
50 INJECTION, SOLUTION INTRAMUSCULAR; INTRAVENOUS ONCE
Status: DISCONTINUED | OUTPATIENT
Start: 2019-01-03 | End: 2019-01-03 | Stop reason: HOSPADM

## 2019-01-03 RX ORDER — EPINEPHRINE 0.1 MG/ML
1 INJECTION INTRACARDIAC; INTRAVENOUS ONCE
Status: DISCONTINUED | OUTPATIENT
Start: 2019-01-03 | End: 2019-01-03 | Stop reason: HOSPADM

## 2019-01-03 RX ORDER — SODIUM CHLORIDE 0.9 % (FLUSH) 0.9 %
5-10 SYRINGE (ML) INJECTION AS NEEDED
Status: DISCONTINUED | OUTPATIENT
Start: 2019-01-03 | End: 2019-01-06 | Stop reason: HOSPADM

## 2019-01-03 RX ORDER — FENTANYL CITRATE 50 UG/ML
25-50 INJECTION, SOLUTION INTRAMUSCULAR; INTRAVENOUS
Status: DISCONTINUED | OUTPATIENT
Start: 2019-01-03 | End: 2019-01-06 | Stop reason: HOSPADM

## 2019-01-03 RX ORDER — ATROPINE SULFATE 1 MG/ML
1 INJECTION, SOLUTION INTRAVENOUS ONCE
Status: DISCONTINUED | OUTPATIENT
Start: 2019-01-03 | End: 2019-01-03 | Stop reason: HOSPADM

## 2019-01-03 RX ORDER — SODIUM CHLORIDE 0.9 % (FLUSH) 0.9 %
5-10 SYRINGE (ML) INJECTION EVERY 8 HOURS
Status: DISCONTINUED | OUTPATIENT
Start: 2019-01-03 | End: 2019-01-06 | Stop reason: HOSPADM

## 2019-01-03 RX ORDER — ONDANSETRON 2 MG/ML
INJECTION INTRAMUSCULAR; INTRAVENOUS AS NEEDED
Status: DISCONTINUED | OUTPATIENT
Start: 2019-01-03 | End: 2019-01-03 | Stop reason: HOSPADM

## 2019-01-03 RX ORDER — MIDAZOLAM HYDROCHLORIDE 1 MG/ML
.25-1 INJECTION, SOLUTION INTRAMUSCULAR; INTRAVENOUS
Status: DISCONTINUED | OUTPATIENT
Start: 2019-01-03 | End: 2019-01-03 | Stop reason: HOSPADM

## 2019-01-03 RX ORDER — SODIUM CHLORIDE, SODIUM LACTATE, POTASSIUM CHLORIDE, CALCIUM CHLORIDE 600; 310; 30; 20 MG/100ML; MG/100ML; MG/100ML; MG/100ML
INJECTION, SOLUTION INTRAVENOUS
Status: DISCONTINUED | OUTPATIENT
Start: 2019-01-03 | End: 2019-01-03 | Stop reason: HOSPADM

## 2019-01-03 RX ORDER — FENTANYL CITRATE 50 UG/ML
100 INJECTION, SOLUTION INTRAMUSCULAR; INTRAVENOUS
Status: DISCONTINUED | OUTPATIENT
Start: 2019-01-03 | End: 2019-01-03 | Stop reason: HOSPADM

## 2019-01-03 RX ORDER — SUCCINYLCHOLINE CHLORIDE 20 MG/ML
INJECTION INTRAMUSCULAR; INTRAVENOUS AS NEEDED
Status: DISCONTINUED | OUTPATIENT
Start: 2019-01-03 | End: 2019-01-03 | Stop reason: HOSPADM

## 2019-01-03 RX ADMIN — PIPERACILLIN AND TAZOBACTAM 3.38 G: 3; .375 INJECTION, POWDER, FOR SOLUTION INTRAVENOUS at 11:41

## 2019-01-03 RX ADMIN — PROPOFOL 200 MG: 10 INJECTION, EMULSION INTRAVENOUS at 13:35

## 2019-01-03 RX ADMIN — PROPOFOL 50 MG: 10 INJECTION, EMULSION INTRAVENOUS at 13:36

## 2019-01-03 RX ADMIN — LIDOCAINE HYDROCHLORIDE 100 MG: 20 INJECTION, SOLUTION EPIDURAL; INFILTRATION; INTRACAUDAL; PERINEURAL at 13:35

## 2019-01-03 RX ADMIN — ONDANSETRON 4 MG: 2 INJECTION INTRAMUSCULAR; INTRAVENOUS at 13:45

## 2019-01-03 RX ADMIN — CLONIDINE HYDROCHLORIDE 0.2 MG: 0.2 TABLET ORAL at 20:42

## 2019-01-03 RX ADMIN — SUCCINYLCHOLINE CHLORIDE 180 MG: 20 INJECTION INTRAMUSCULAR; INTRAVENOUS at 13:35

## 2019-01-03 RX ADMIN — FENTANYL CITRATE 50 MCG: 50 INJECTION, SOLUTION INTRAMUSCULAR; INTRAVENOUS at 13:35

## 2019-01-03 RX ADMIN — SODIUM CHLORIDE, SODIUM LACTATE, POTASSIUM CHLORIDE, CALCIUM CHLORIDE: 600; 310; 30; 20 INJECTION, SOLUTION INTRAVENOUS at 13:33

## 2019-01-03 RX ADMIN — DEXAMETHASONE SODIUM PHOSPHATE 4 MG: 4 INJECTION, SOLUTION INTRA-ARTICULAR; INTRALESIONAL; INTRAMUSCULAR; INTRAVENOUS; SOFT TISSUE at 13:45

## 2019-01-03 RX ADMIN — PIPERACILLIN AND TAZOBACTAM 3.38 G: 3; .375 INJECTION, POWDER, FOR SOLUTION INTRAVENOUS at 02:19

## 2019-01-03 RX ADMIN — IOPAMIDOL 7 ML: 612 INJECTION, SOLUTION INTRAVENOUS at 13:59

## 2019-01-03 RX ADMIN — SODIUM CHLORIDE, SODIUM LACTATE, POTASSIUM CHLORIDE, AND CALCIUM CHLORIDE 125 ML/HR: 600; 310; 30; 20 INJECTION, SOLUTION INTRAVENOUS at 12:10

## 2019-01-03 RX ADMIN — PIPERACILLIN AND TAZOBACTAM 3.38 G: 3; .375 INJECTION, POWDER, FOR SOLUTION INTRAVENOUS at 20:31

## 2019-01-03 RX ADMIN — DOCUSATE SODIUM 100 MG: 100 CAPSULE, LIQUID FILLED ORAL at 18:14

## 2019-01-03 NOTE — PROGRESS NOTES
Bedside shift change report given to Felicitas Frankel, RN (oncoming nurse) by Charles Larsen RN (offgoing nurse). Report included the following information SBAR, Kardex, Procedure Summary, Intake/Output, MAR, Accordion and Recent Results.

## 2019-01-03 NOTE — PROGRESS NOTES
CM reviewed case with treatment team during [address]. Plan today for ERCP test today with determination of need for surgery still pending. CM to continue to follow for discharge planning needs.

## 2019-01-03 NOTE — ROUTINE PROCESS
[facility] 1955 
[id number] Situation: 
Verbal report received from: Tammi Michael RN Procedure: Procedure(s): ENDOSCOPIC RETROGRADE CHOLANGIOPANCREATOGRAPHY (ERCP) ENDOSCOPIC SPHINCTEROTOMY 
ENDOSCOPIC STONE EXTRACTION/BALLOON SWEEP 
ENDOSCOPIC BRUSHING 
ENDOSCOPY WITH PROSTHESIS OR STENT PLACEMENT Background: 
 
Preoperative diagnosis: Common Bile Duct Stone Postoperative diagnosis: 1. CBD Stones 2. Biliary Stricture :  Dr. Cesar Jones Assistant(s): Endoscopy Technician-1: Patricia Ortiz Endoscopy RN-1: Lily Dent RN Specimens: * No specimens in log * H. Pylori  no Assessment: 
Intra-procedure medications Anesthesia gave intra-procedure sedation and medications, see anesthesia flow sheet yes Intravenous fluids[facility] Vital signs stable Abdominal assessment: round and soft Recommendation:. Return to floor Family or Friend Pt wife Permission to share finding with family or friend yes

## 2019-01-03 NOTE — ANESTHESIA PREPROCEDURE EVALUATION
Anesthetic History No history of anesthetic complications Review of Systems / Medical History Patient summary reviewed, nursing notes reviewed and pertinent labs reviewed Pulmonary Within defined limits Neuro/Psych Within defined limits Cardiovascular Within defined limits Hypertension GI/Hepatic/Renal 
Within defined limits Endo/Other Within defined limits Other Findings Physical Exam 
 
Airway Mallampati: II 
TM Distance: > 6 cm Neck ROM: normal range of motion Mouth opening: Normal 
 
 Cardiovascular Regular rate and rhythm,  S1 and S2 normal,  no murmur, click, rub, or gallop Dental 
No notable dental hx Pulmonary Breath sounds clear to auscultation Abdominal 
GI exam deferred Other Findings Anesthetic Plan ASA: 2 Anesthesia type: MAC Anesthetic plan and risks discussed with: Patient

## 2019-01-03 NOTE — PROGRESS NOTES
Progress Note Patient: Miguel Bowens MRN: [id number]  SSN: [id number] YOB: 1955  Age: 66 y.o. Sex: male Admit Date: 2019 Acute cholecystitis with biliary obstruction Procedure:  Procedure(s): ENDOSCOPIC RETROGRADE CHOLANGIOPANCREATOGRAPHY (ERCP) Subjective: No acute surgical issues. MRCP showed biliary obstruction from gallstones. Discussed with Dr. Mckayla De Dios - plan ERCP today. Objective:  
 
Visit Vitals /84 Pulse 85 Temp 98.4 °F (36.9 °C) Resp 18 Ht 5' 10\" (1.778 m) Wt 220 lb (99.8 kg) SpO2 94% BMI 31.57 kg/m² Temp (24hrs), Av.5 °F (36.9 °C), Min:98.4 °F (36.9 °C), Max:98.6 °F (37 °C) Physical Exam:   
Gen:  NAD Pulm:  Unlabored Abd:  S/ND/mild TTP without guarding or rebound Recent Results (from the past 24 hour(s)) CBC W/O DIFF Collection Time: 19  2:20 AM  
Result Value Ref Range WBC 3.9 (L) 4.1 - 11.1 K/uL  
 RBC 4.87 4.10 - 5.70 M/uL  
 HGB 14.1 12.1 - 17.0 g/dL HCT 41.6 36.6 - 50.3 % MCV 85.4 80.0 - 99.0 FL  
 MCH 29.0 26.0 - 34.0 PG  
 MCHC 33.9 30.0 - 36.5 g/dL  
 RDW 12.6 11.5 - 14.5 % PLATELET 021 977 - 995 K/uL MPV 10.5 8.9 - 12.9 FL  
 NRBC 0.0 0  WBC ABSOLUTE NRBC 0.00 0.00 - 0.01 K/uL METABOLIC PANEL, COMPREHENSIVE Collection Time: 19  2:20 AM  
Result Value Ref Range Sodium 139 136 - 145 mmol/L Potassium 3.8 3.5 - 5.1 mmol/L Chloride 106 97 - 108 mmol/L  
 CO2 26 21 - 32 mmol/L Anion gap 7 5 - 15 mmol/L Glucose 87 65 - 100 mg/dL BUN 10 6 - 20 MG/DL Creatinine 0.82 0.70 - 1.30 MG/DL  
 BUN/Creatinine ratio 12 12 - 20 GFR est AA >60 >60 ml/min/1.73m2 GFR est non-AA >60 >60 ml/min/1.73m2 Calcium 8.8 8.5 - 10.1 MG/DL Bilirubin, total 4.9 (H) 0.2 - 1.0 MG/DL  
 ALT (SGPT) 671 (H) 12 - 78 U/L  
 AST (SGOT) 261 (H) 15 - 37 U/L Alk. phosphatase 196 (H) 45 - 117 U/L Protein, total 5.9 (L) 6.4 - 8.2 g/dL Albumin 2.8 (L) 3.5 - 5.0 g/dL Globulin 3.1 2.0 - 4.0 g/dL A-G Ratio 0.9 (L) 1.1 - 2.2 LIPASE Collection Time: 01/03/19  2:20 AM  
Result Value Ref Range Lipase 111 73 - 393 U/L Assessment:  
 
Hospital Problems  Never Reviewed Codes Class Noted POA Calculus of bile duct with cholecystitis ICD-10-CM: K80.40 ICD-9-CM: 574.40  1/1/2019 Unknown Plan/Recommendations/Medical Decision Making: - Biliary obstruction:  Plan ERCP today and lap cholecystectomy tomorrow - Resume diet after ERCP and NPO after midnight - Labs in am 
- Pain control 
- Continue antibiotic therapy Signed By: Ana Em MD   
 January 3, 2019

## 2019-01-03 NOTE — ROUTINE PROCESS
Patient reported dizziness on dilaudid overnight, pain well controlled and denied any nausea. Bedside shift change report given to [facility] (oncoming nurse) by Damian Sotomayor RN (offgoing nurse). Report included the following information SBAR, Kardex, Procedure Summary, Intake/Output and MAR.

## 2019-01-03 NOTE — ANESTHESIA POSTPROCEDURE EVALUATION
Procedure(s): ENDOSCOPIC RETROGRADE CHOLANGIOPANCREATOGRAPHY (ERCP) ENDOSCOPIC SPHINCTEROTOMY 
ENDOSCOPIC STONE EXTRACTION/BALLOON SWEEP 
ENDOSCOPIC BRUSHING 
ENDOSCOPY WITH PROSTHESIS OR STENT PLACEMENT. Anesthesia Post Evaluation Patient location during evaluation: PACU Patient participation: complete - patient participated Level of consciousness: awake and alert Pain management: adequate Airway patency: patent Anesthetic complications: no 
Cardiovascular status: acceptable Respiratory status: acceptable Hydration status: acceptable Comments: I have seen and evaluated the patient and is ready for discharge. Inge Bailey MD 
 
Post anesthesia nausea and vomiting:  none Visit Vitals /81 Pulse 93 Temp 37.2 °C (99 °F) Resp 18 Ht 5' 10\" (1.778 m) Wt 99.8 kg (220 lb) SpO2 96% BMI 31.57 kg/m²

## 2019-01-03 NOTE — CONSULTS
[address] [facility].   [address]  [facility] [address]        GASTROENTEROLOGY CONSULTATION NOTE  Will Susyaretha Han  [phone] office  [phone] NP/PA in-hospital cell phone M-F until 4:30PM  After 5PM or on weekends, please call  for physician on call        NAME:  Felicia Johnson   :   1955   MRN:   [id number]       Referring Physician: Dr. Tere Montano Date: 1/3/2019 8:51 AM    Chief Complaint: abdominal pain     History of Present Illness:  Patient is a 66 y.o. who is seen in consultation at the request of Dr. Yolette Payne for gallstone in common bile duct - needs ERCP. Patient has a past medical history significant for hypertension. He presented to the ED with complaints of abdominal pain. Patient was admitted to the hospital on 18 for biliary obstruction. Patient complains of epigastric abdominal pain for the last approximately 6 days. He describes the pain as an intermittent sharp, stabbing sensation. There is also generalized abdominal pain that he describes as a burning sensation. Pain is worse with eating. There has been associated dry heaves with eating. No nausea or significant vomiting. No change in bowel habits, melena, or hematochezia. No fevers or chills. No NSAID use. No anticoagulation.  + Alcohol use (30 beers/week). No tobacco use. No history of abdominal surgeries. No history of EGD or colonoscopy. I have reviewed the emergency room note, hospital admission note, notes by all other clinicians who have seen the patient during this hospitalization to date. I have reviewed the problem list and the reason for this hospitalization. I have reviewed the allergies and the medications the patient was taking at home prior to this hospitalization. PMH:  Past Medical History:   Diagnosis Date    Hypertension        PSH:  History reviewed. No pertinent surgical history.     Allergies:  No Known Allergies    Home Medications:  None Hospital Medications:  Current Facility-Administered Medications   Medication Dose Route Frequency    cloNIDine HCl (CATAPRES) tablet 0.2 mg  0.2 mg Oral Q4H PRN    lactated Ringers infusion  125 mL/hr IntraVENous CONTINUOUS    ondansetron (ZOFRAN) injection 4 mg  4 mg IntraVENous Q4H PRN    HYDROmorphone (DILAUDID) injection 1 mg  1 mg IntraVENous Q3H PRN    piperacillin-tazobactam (ZOSYN) 3.375 g in 0.9% sodium chloride (MBP/ADV) 100 mL  3.375 g IntraVENous Q8H    ketorolac (TORADOL) injection 15 mg  15 mg IntraVENous Q6H PRN       Social History:  Social History     Tobacco Use    Smoking status: Never Smoker    Smokeless tobacco: Never Used   Substance Use Topics    Alcohol use: Yes     Comment: 5 times a week       Family History:  History reviewed. No pertinent family history. Review of Systems:  Constitutional: negative fever, negative chills, negative weight loss  Eyes:   negative visual changes  ENT:   negative sore throat, tongue or lip swelling  Respiratory:  negative cough, negative dyspnea  Cards:  negative for chest pain, palpitations, lower extremity edema  GI:   See HPI  :  negative for frequency, dysuria; + dark colored urine  Integument:  negative for rash and pruritus  Heme:  negative for easy bruising and gum/nose bleeding  Musculoskeletal:negative for myalgias, back pain and muscle weakness  Neuro:    negative for headaches, dizziness  Psych: + for feelings of anxiety, negative depression     Objective:     Patient Vitals for the past 8 hrs:   BP Temp Pulse Resp SpO2   01/03/19 0828 145/84       01/03/19 0824 144/86 98.4 °F (36.9 °C) 85 18 94 %     No intake/output data recorded. No intake/output data recorded.     EXAM:     CONST:  Pleasant male lying in bed, no acute distress, wife is at the bedside   NEURO:  Alert and oriented x 3   HEENT: EOMI, no scleral icterus   LUNGS: CTA bilaterally anteriorly   CARD:  S1 S2   ABD:  Soft, non distended, no tenderness, no rebound, no guarding. + Bowel sounds. EXT:  Warm   PSYCH: Full, somewhat anxious     Data Review     Recent Labs     01/03/19 0220 01/02/19 0447   WBC 3.9* 9.2   HGB 14.1 15.2   HCT 41.6 44.5    199     Recent Labs     01/03/19 0220 01/02/19  0447    137   K 3.8 3.7    106   CO2 26 23   BUN 10 8   CREA 0.82 0.98   GLU 87 123*   CA 8.8 8.5     Recent Labs     01/03/19 0220 01/02/19  0447 01/01/19  2110   SGOT 261* 445* 625*   * 221* 260*   TP 5.9* 6.5 7.5   ALB 2.8* 3.2* 3.7   GLOB 3.1 3.3 3.8   LPSE 111  --  80     No results for input(s): INR, PTP, APTT in the last 72 hours. No lab exists for component: INREXT      1/2/19  MRI ABDOMEN AND MRCP WITH AND WITHOUT CONTRAST.      INDICATION: Abnormal liver function tests; Cholelithiasis; Jaundice; Evaluate  for choledocholithiasis     COMPARISON: CT 1/1/2019     TECHNIQUE: Multisequence, multiplanar MRI of the abdomen was performed prior to  and after the administration of 10 mL of Gadavist (gadobutrol)  IV contrast.  Multiplanar T1 and T2 sequences were obtained prior to contrast. Multiplanar  postcontrast T1 weighted images were obtained with fat saturation during the  arterial, portal venous, and equilibrium phases. Subtraction images were  reconstructed.     Heavily T2-weighted thick slab and thin slice images were obtained in the  oblique coronal plane through the biliary tree (MRCP).     FINDINGS:      MRCP: Mild intrahepatic and extra hepatic biliary ductal dilatation. There are 2  stones in the common bile duct. One of these measures 12 x 10 mm and is in the  mid common bile duct approximately 4 cm from the ampulla. The second is in the  distal common bile duct measuring 7 x 7 mm and is about 1.8 cm from the duodenal  ampulla. Normal course and caliber of the pancreatic duct. [facility] LIVER: Patchy hepatic steatosis, predominantly in the right hepatic lobe. . No  mass. GALLBLADDER: The gallbladder is small and contracted. Cholelithiasis. PANCREAS: No pancreatic or peripancreatic edema. No mass. SPLEEN: Mildly enlarged. ADRENALS: No nodules. KIDNEYS: Multiple subcentimeter simple cysts are noted in the kidneys. No solid  mass or hydronephrosis. DISTAL ESOPHAGUS, STOMACH, AND VISUALIZED BOWEL: No acute findings, including no  obstruction. PERITONEUM: No free fluid and no abdominal lymphadenopathy. VISUALIZED LUNG BASES: Grossly clear within the sensitivity of MRI. BONES: No suspicious lesions.        IMPRESSION  IMPRESSION:   1. Two stones noted in the common bile duct described above with mild upstream  dilatation of the common duct and intrahepatic ducts. Small, contracted  gallbladder filled with stones. No findings to suggest acute cholecystitis. 2. Hepatic steatosis.     3. Nonspecific mild splenomegaly. Assessment:   · Epigastric abdominal pain: WBC 3.9, Hgb 14.1, AST: 261, ALT: 671, alkaline phosphatase: 196, total bilirubin: 4.9 (7.8 yesterday), lipase normal. CT abdomen/pelvis with IV contrast (1/1/19). MRI/MRCP (1/2/19): 2 stones noted in the common bile duct with mid upstream dilatation of the common duct and intrahepatic ducts; small, contracted gallbladder filled with stones; no findings to suggest acute cholecystitis; findings above. · Choledocholithiasis     Patient Active Problem List   Diagnosis Code    Calculus of bile duct with cholecystitis K80.40     Plan:     · NPO  · Continue supportive measures  · Trend LFTs  · Plan for ERCP today with Dr. Mckayla De Dios. Risks of the procedure to include (but not limited to) anesthesia, bleeding, infection, perforation, and post-ERCP pancreatitis were discussed with the patient and his wife at the bedside. Patient understands and is in agreement with the plan. · Thank you for allowing me to participate in care of Anita Mesa     Signed By: Kenna Ayoub     1/3/2019  8:51 AM       GI Attending: Patient seen and examined.  Discussed risks of the procedure as above and he agreed to proceed. Mitchel De Dios MD

## 2019-01-03 NOTE — PROGRESS NOTES
TRANSFER - OUT REPORT: 
 
Verbal report given to Kelvin(name) on [facility]  being transferred to [facility](unit) for routine progression of care Report consisted of patients Situation, Background, Assessment and  
Recommendations(SBAR). Information from the following report(s) SBAR, Procedure Summary, Intake/Output and MAR was reviewed with the receiving nurse. Lines:  
Peripheral IV 01/01/19 Left Antecubital (Active) Site Assessment Clean, dry, & intact 1/3/2019  1:31 AM  
Phlebitis Assessment 0 1/3/2019  1:31 AM  
Infiltration Assessment 0 1/3/2019  1:31 AM  
Dressing Status Clean, dry, & intact 1/3/2019  1:31 AM  
Dressing Type Transparent 1/3/2019  1:31 AM  
Hub Color/Line Status Pink; Infusing 1/3/2019  1:31 AM  
Action Taken Open ports on tubing capped 1/2/2019  9:10 AM  
Alcohol Cap Used Yes 1/3/2019  1:31 AM  
   
Saline Lock 01/03/19 Left Hand (Active) Opportunity for questions and clarification was provided.

## 2019-01-03 NOTE — PROGRESS NOTES

## 2019-01-03 NOTE — PROGRESS NOTES
GI 
 
Full procedure note to follow. ERCP completed. Distal common bile duct stricture present - brushings performed. One stone removed. Second larger stone (approximately 12-13 mm) was upstream and could not be removed today. Plastic biliary stent placed. Continue NPO status for now. If he is pain free, then he can have clear liquids later today. Will follow up on patient later today to decide on diet advancement. Mitchel Rajan MD

## 2019-01-03 NOTE — PROGRESS NOTES
[place] 1955 
[id number] Situation: 
 
Scheduled Procedure: Procedure(s): ENDOSCOPIC RETROGRADE CHOLANGIOPANCREATOGRAPHY (ERCP) Verbal report received from: Annabel Glass RN 
Preoperative diagnosis: Common Bile Duct Aubrey Proper MRCP bilary obstruction Background: 
 
Procedure: Procedure(s): ENDOSCOPIC RETROGRADE CHOLANGIOPANCREATOGRAPHY (ERCP) Physician performing procedure; Dr. Stephanie Martinez SBAR QUESTIONS FLOOR TO ENDO RN 
 
NPO Status/Last PO Intake: Since midnight Pregnancy Test:Not applicable If yes, result: none Is the patient taking Blood Thinners: NO Is the patient diabetic:no Does the patient have a Pacemaker/Defibrillator in place?: no  
Does the patient need antibiotics before/during/after procedure: no  
Does the patient have SCD in place:no Is patient on CONTACT precautions:no Assessment: 
Are the vital signs stable prior to patient coming to ENDO?  yes Is the patient alert/oriented and able to sign consent for the procedures:yes Does the patient have a patient IV in place? 2 IV's Recommendation: 
Family or Friend present no Permission to share finding with Family or Friend n/a

## 2019-01-04 ENCOUNTER — ANESTHESIA EVENT (OUTPATIENT)
Dept: SURGERY | Age: 64
DRG: 419 | End: 2019-01-04
Payer: COMMERCIAL

## 2019-01-04 ENCOUNTER — ANESTHESIA (OUTPATIENT)
Dept: SURGERY | Age: 64
DRG: 419 | End: 2019-01-04
Payer: COMMERCIAL

## 2019-01-04 LAB
ALBUMIN SERPL-MCNC: 2.8 G/DL (ref 3.5–5)
ALBUMIN/GLOB SERPL: 0.8 {RATIO} (ref 1.1–2.2)
ALP SERPL-CCNC: 191 U/L (ref 45–117)
ALT SERPL-CCNC: 492 U/L (ref 12–78)
ANION GAP SERPL CALC-SCNC: 7 MMOL/L (ref 5–15)
AST SERPL-CCNC: 133 U/L (ref 15–37)
BILIRUB SERPL-MCNC: 1.9 MG/DL (ref 0.2–1)
BUN SERPL-MCNC: 11 MG/DL (ref 6–20)
BUN/CREAT SERPL: 13 (ref 12–20)
CALCIUM SERPL-MCNC: 8.6 MG/DL (ref 8.5–10.1)
CHLORIDE SERPL-SCNC: 105 MMOL/L (ref 97–108)
CO2 SERPL-SCNC: 26 MMOL/L (ref 21–32)
CREAT SERPL-MCNC: 0.82 MG/DL (ref 0.7–1.3)
ERYTHROCYTE [DISTWIDTH] IN BLOOD BY AUTOMATED COUNT: 12.5 % (ref 11.5–14.5)
GLOBULIN SER CALC-MCNC: 3.4 G/DL (ref 2–4)
GLUCOSE SERPL-MCNC: 94 MG/DL (ref 65–100)
HCT VFR BLD AUTO: 41.8 % (ref 36.6–50.3)
HGB BLD-MCNC: 13.9 G/DL (ref 12.1–17)
LIPASE SERPL-CCNC: 155 U/L (ref 73–393)
MCH RBC QN AUTO: 28.6 PG (ref 26–34)
MCHC RBC AUTO-ENTMCNC: 33.3 G/DL (ref 30–36.5)
MCV RBC AUTO: 86 FL (ref 80–99)
NRBC # BLD: 0 K/UL (ref 0–0.01)
NRBC BLD-RTO: 0 PER 100 WBC
PLATELET # BLD AUTO: 194 K/UL (ref 150–400)
PMV BLD AUTO: 10.6 FL (ref 8.9–12.9)
POTASSIUM SERPL-SCNC: 3.6 MMOL/L (ref 3.5–5.1)
PROT SERPL-MCNC: 6.2 G/DL (ref 6.4–8.2)
RBC # BLD AUTO: 4.86 M/UL (ref 4.1–5.7)
SODIUM SERPL-SCNC: 138 MMOL/L (ref 136–145)
WBC # BLD AUTO: 4.3 K/UL (ref 4.1–11.1)

## 2019-01-04 PROCEDURE — 77030035045 HC TRCR ENDOSC VRSPRT BLDLSS COVD -B: Performed by: SURGERY

## 2019-01-04 PROCEDURE — 65270000032 HC RM SEMIPRIVATE

## 2019-01-04 PROCEDURE — 77030020747 HC TU INSUF ENDOSC TELE -A: Performed by: SURGERY

## 2019-01-04 PROCEDURE — 74011250636 HC RX REV CODE- 250/636

## 2019-01-04 PROCEDURE — 77030020263 HC SOL INJ SOD CL0.9% LFCR 1000ML: Performed by: SURGERY

## 2019-01-04 PROCEDURE — 77030020053 HC ELECTRD LAPSCP COVD -B: Performed by: SURGERY

## 2019-01-04 PROCEDURE — 76010000131 HC OR TIME 2 TO 2.5 HR: Performed by: SURGERY

## 2019-01-04 PROCEDURE — 77030032060 HC PWDR HEMSTAT ARISTA ASRB 3GM BARD -C: Performed by: SURGERY

## 2019-01-04 PROCEDURE — 77030002933 HC SUT MCRYL J&J -A: Performed by: SURGERY

## 2019-01-04 PROCEDURE — 74011250636 HC RX REV CODE- 250/636: Performed by: SURGERY

## 2019-01-04 PROCEDURE — 77030032490 HC SLV COMPR SCD KNE COVD -B: Performed by: SURGERY

## 2019-01-04 PROCEDURE — 77030031139 HC SUT VCRL2 J&J -A: Performed by: SURGERY

## 2019-01-04 PROCEDURE — 77030027743 HC APPL F/HEMSTAT BARD -B: Performed by: SURGERY

## 2019-01-04 PROCEDURE — 77030039266 HC ADH SKN EXOFIN S2SG -A: Performed by: SURGERY

## 2019-01-04 PROCEDURE — 80053 COMPREHEN METABOLIC PANEL: CPT

## 2019-01-04 PROCEDURE — 77030037032 HC INSRT SCIS CLICKLLINE DISP STOR -B: Performed by: SURGERY

## 2019-01-04 PROCEDURE — 77030008684 HC TU ET CUF COVD -B: Performed by: ANESTHESIOLOGY

## 2019-01-04 PROCEDURE — 77030009852 HC PCH RTVR ENDOSC COVD -B: Performed by: SURGERY

## 2019-01-04 PROCEDURE — 77030022474 HC RELD STPLR ENDO GIA COVD -C: Performed by: SURGERY

## 2019-01-04 PROCEDURE — 77030013567 HC DRN WND RESERV BARD -A: Performed by: SURGERY

## 2019-01-04 PROCEDURE — 77030012029 HC APPL CLP LIG COVD -C: Performed by: SURGERY

## 2019-01-04 PROCEDURE — 77030035051: Performed by: SURGERY

## 2019-01-04 PROCEDURE — 74011250637 HC RX REV CODE- 250/637: Performed by: SURGERY

## 2019-01-04 PROCEDURE — 0FT44ZZ RESECTION OF GALLBLADDER, PERCUTANEOUS ENDOSCOPIC APPROACH: ICD-10-PCS | Performed by: SURGERY

## 2019-01-04 PROCEDURE — 88304 TISSUE EXAM BY PATHOLOGIST: CPT

## 2019-01-04 PROCEDURE — 77030011640 HC PAD GRND REM COVD -A: Performed by: SURGERY

## 2019-01-04 PROCEDURE — 85027 COMPLETE CBC AUTOMATED: CPT

## 2019-01-04 PROCEDURE — 77030035048 HC TRCR ENDOSC OPTCL COVD -B: Performed by: SURGERY

## 2019-01-04 PROCEDURE — 74011000250 HC RX REV CODE- 250

## 2019-01-04 PROCEDURE — 77030020782 HC GWN BAIR PAWS FLX 3M -B

## 2019-01-04 PROCEDURE — 77030022473 HC HNDL ENDO GIA UNIV USDA -C: Performed by: SURGERY

## 2019-01-04 PROCEDURE — 77030008771 HC TU NG SALEM SUMP -A: Performed by: ANESTHESIOLOGY

## 2019-01-04 PROCEDURE — 74011250636 HC RX REV CODE- 250/636: Performed by: ANESTHESIOLOGY

## 2019-01-04 PROCEDURE — 36415 COLL VENOUS BLD VENIPUNCTURE: CPT

## 2019-01-04 PROCEDURE — 76060000035 HC ANESTHESIA 2 TO 2.5 HR: Performed by: SURGERY

## 2019-01-04 PROCEDURE — 74011000258 HC RX REV CODE- 258: Performed by: SURGERY

## 2019-01-04 PROCEDURE — 74011000250 HC RX REV CODE- 250: Performed by: SURGERY

## 2019-01-04 PROCEDURE — 83690 ASSAY OF LIPASE: CPT

## 2019-01-04 PROCEDURE — 76210000006 HC OR PH I REC 0.5 TO 1 HR: Performed by: SURGERY

## 2019-01-04 PROCEDURE — 74011250636 HC RX REV CODE- 250/636: Performed by: INTERNAL MEDICINE

## 2019-01-04 PROCEDURE — 77030026438 HC STYL ET INTUB CARD -A: Performed by: ANESTHESIOLOGY

## 2019-01-04 PROCEDURE — 77030002895 HC DEV VASC CLOSR COVD -B: Performed by: SURGERY

## 2019-01-04 RX ORDER — SODIUM CHLORIDE 0.9 % (FLUSH) 0.9 %
5-10 SYRINGE (ML) INJECTION EVERY 8 HOURS
Status: DISCONTINUED | OUTPATIENT
Start: 2019-01-04 | End: 2019-01-06 | Stop reason: HOSPADM

## 2019-01-04 RX ORDER — SODIUM CHLORIDE, SODIUM LACTATE, POTASSIUM CHLORIDE, CALCIUM CHLORIDE 600; 310; 30; 20 MG/100ML; MG/100ML; MG/100ML; MG/100ML
125 INJECTION, SOLUTION INTRAVENOUS CONTINUOUS
Status: DISCONTINUED | OUTPATIENT
Start: 2019-01-04 | End: 2019-01-04 | Stop reason: HOSPADM

## 2019-01-04 RX ORDER — PROPOFOL 10 MG/ML
INJECTION, EMULSION INTRAVENOUS AS NEEDED
Status: DISCONTINUED | OUTPATIENT
Start: 2019-01-04 | End: 2019-01-04 | Stop reason: HOSPADM

## 2019-01-04 RX ORDER — MIDAZOLAM HYDROCHLORIDE 1 MG/ML
1 INJECTION, SOLUTION INTRAMUSCULAR; INTRAVENOUS AS NEEDED
Status: DISCONTINUED | OUTPATIENT
Start: 2019-01-04 | End: 2019-01-04 | Stop reason: HOSPADM

## 2019-01-04 RX ORDER — BUPIVACAINE HYDROCHLORIDE AND EPINEPHRINE 2.5; 5 MG/ML; UG/ML
INJECTION, SOLUTION EPIDURAL; INFILTRATION; INTRACAUDAL; PERINEURAL AS NEEDED
Status: DISCONTINUED | OUTPATIENT
Start: 2019-01-04 | End: 2019-01-04 | Stop reason: HOSPADM

## 2019-01-04 RX ORDER — SODIUM CHLORIDE, SODIUM LACTATE, POTASSIUM CHLORIDE, CALCIUM CHLORIDE 600; 310; 30; 20 MG/100ML; MG/100ML; MG/100ML; MG/100ML
INJECTION, SOLUTION INTRAVENOUS
Status: DISCONTINUED | OUTPATIENT
Start: 2019-01-04 | End: 2019-01-04 | Stop reason: HOSPADM

## 2019-01-04 RX ORDER — GLYCOPYRROLATE 0.2 MG/ML
INJECTION INTRAMUSCULAR; INTRAVENOUS AS NEEDED
Status: DISCONTINUED | OUTPATIENT
Start: 2019-01-04 | End: 2019-01-04 | Stop reason: HOSPADM

## 2019-01-04 RX ORDER — SODIUM CHLORIDE 9 MG/ML
25 INJECTION, SOLUTION INTRAVENOUS CONTINUOUS
Status: DISCONTINUED | OUTPATIENT
Start: 2019-01-04 | End: 2019-01-04 | Stop reason: HOSPADM

## 2019-01-04 RX ORDER — SODIUM CHLORIDE 0.9 % (FLUSH) 0.9 %
5-10 SYRINGE (ML) INJECTION AS NEEDED
Status: DISCONTINUED | OUTPATIENT
Start: 2019-01-04 | End: 2019-01-06 | Stop reason: HOSPADM

## 2019-01-04 RX ORDER — ONDANSETRON 2 MG/ML
4 INJECTION INTRAMUSCULAR; INTRAVENOUS AS NEEDED
Status: DISCONTINUED | OUTPATIENT
Start: 2019-01-04 | End: 2019-01-04 | Stop reason: HOSPADM

## 2019-01-04 RX ORDER — FENTANYL CITRATE 50 UG/ML
25 INJECTION, SOLUTION INTRAMUSCULAR; INTRAVENOUS
Status: DISCONTINUED | OUTPATIENT
Start: 2019-01-04 | End: 2019-01-04 | Stop reason: HOSPADM

## 2019-01-04 RX ORDER — FENTANYL CITRATE 50 UG/ML
INJECTION, SOLUTION INTRAMUSCULAR; INTRAVENOUS AS NEEDED
Status: DISCONTINUED | OUTPATIENT
Start: 2019-01-04 | End: 2019-01-04 | Stop reason: HOSPADM

## 2019-01-04 RX ORDER — FENTANYL CITRATE 50 UG/ML
50 INJECTION, SOLUTION INTRAMUSCULAR; INTRAVENOUS AS NEEDED
Status: DISCONTINUED | OUTPATIENT
Start: 2019-01-04 | End: 2019-01-04 | Stop reason: HOSPADM

## 2019-01-04 RX ORDER — KETAMINE HYDROCHLORIDE 10 MG/ML
INJECTION, SOLUTION INTRAMUSCULAR; INTRAVENOUS AS NEEDED
Status: DISCONTINUED | OUTPATIENT
Start: 2019-01-04 | End: 2019-01-04 | Stop reason: HOSPADM

## 2019-01-04 RX ORDER — LIDOCAINE HYDROCHLORIDE 10 MG/ML
0.1 INJECTION, SOLUTION EPIDURAL; INFILTRATION; INTRACAUDAL; PERINEURAL AS NEEDED
Status: DISCONTINUED | OUTPATIENT
Start: 2019-01-04 | End: 2019-01-04 | Stop reason: HOSPADM

## 2019-01-04 RX ORDER — MIDAZOLAM HYDROCHLORIDE 1 MG/ML
INJECTION, SOLUTION INTRAMUSCULAR; INTRAVENOUS AS NEEDED
Status: DISCONTINUED | OUTPATIENT
Start: 2019-01-04 | End: 2019-01-04 | Stop reason: HOSPADM

## 2019-01-04 RX ORDER — SUCCINYLCHOLINE CHLORIDE 20 MG/ML
INJECTION INTRAMUSCULAR; INTRAVENOUS AS NEEDED
Status: DISCONTINUED | OUTPATIENT
Start: 2019-01-04 | End: 2019-01-04 | Stop reason: HOSPADM

## 2019-01-04 RX ORDER — HYDROMORPHONE HYDROCHLORIDE 2 MG/ML
INJECTION, SOLUTION INTRAMUSCULAR; INTRAVENOUS; SUBCUTANEOUS AS NEEDED
Status: DISCONTINUED | OUTPATIENT
Start: 2019-01-04 | End: 2019-01-04 | Stop reason: HOSPADM

## 2019-01-04 RX ORDER — ONDANSETRON 2 MG/ML
INJECTION INTRAMUSCULAR; INTRAVENOUS AS NEEDED
Status: DISCONTINUED | OUTPATIENT
Start: 2019-01-04 | End: 2019-01-04 | Stop reason: HOSPADM

## 2019-01-04 RX ORDER — SODIUM CHLORIDE, SODIUM LACTATE, POTASSIUM CHLORIDE, CALCIUM CHLORIDE 600; 310; 30; 20 MG/100ML; MG/100ML; MG/100ML; MG/100ML
125 INJECTION, SOLUTION INTRAVENOUS CONTINUOUS
Status: DISCONTINUED | OUTPATIENT
Start: 2019-01-04 | End: 2019-01-06 | Stop reason: HOSPADM

## 2019-01-04 RX ORDER — MIDAZOLAM HYDROCHLORIDE 1 MG/ML
0.5 INJECTION, SOLUTION INTRAMUSCULAR; INTRAVENOUS
Status: DISCONTINUED | OUTPATIENT
Start: 2019-01-04 | End: 2019-01-04 | Stop reason: HOSPADM

## 2019-01-04 RX ORDER — HYDROMORPHONE HYDROCHLORIDE 2 MG/ML
1 INJECTION, SOLUTION INTRAMUSCULAR; INTRAVENOUS; SUBCUTANEOUS
Status: DISCONTINUED | OUTPATIENT
Start: 2019-01-04 | End: 2019-01-06 | Stop reason: HOSPADM

## 2019-01-04 RX ORDER — OXYCODONE AND ACETAMINOPHEN 5; 325 MG/1; MG/1
2 TABLET ORAL
Status: DISCONTINUED | OUTPATIENT
Start: 2019-01-04 | End: 2019-01-06 | Stop reason: HOSPADM

## 2019-01-04 RX ORDER — DEXAMETHASONE SODIUM PHOSPHATE 4 MG/ML
INJECTION, SOLUTION INTRA-ARTICULAR; INTRALESIONAL; INTRAMUSCULAR; INTRAVENOUS; SOFT TISSUE AS NEEDED
Status: DISCONTINUED | OUTPATIENT
Start: 2019-01-04 | End: 2019-01-04 | Stop reason: HOSPADM

## 2019-01-04 RX ORDER — NEOSTIGMINE METHYLSULFATE 1 MG/ML
INJECTION INTRAVENOUS AS NEEDED
Status: DISCONTINUED | OUTPATIENT
Start: 2019-01-04 | End: 2019-01-04 | Stop reason: HOSPADM

## 2019-01-04 RX ORDER — ROPIVACAINE HYDROCHLORIDE 5 MG/ML
30 INJECTION, SOLUTION EPIDURAL; INFILTRATION; PERINEURAL ONCE
Status: DISCONTINUED | OUTPATIENT
Start: 2019-01-04 | End: 2019-01-04 | Stop reason: HOSPADM

## 2019-01-04 RX ORDER — LIDOCAINE HYDROCHLORIDE 20 MG/ML
INJECTION, SOLUTION EPIDURAL; INFILTRATION; INTRACAUDAL; PERINEURAL AS NEEDED
Status: DISCONTINUED | OUTPATIENT
Start: 2019-01-04 | End: 2019-01-04 | Stop reason: HOSPADM

## 2019-01-04 RX ORDER — MORPHINE SULFATE 10 MG/ML
2 INJECTION, SOLUTION INTRAMUSCULAR; INTRAVENOUS
Status: DISCONTINUED | OUTPATIENT
Start: 2019-01-04 | End: 2019-01-04 | Stop reason: HOSPADM

## 2019-01-04 RX ORDER — SODIUM CHLORIDE, SODIUM LACTATE, POTASSIUM CHLORIDE, CALCIUM CHLORIDE 600; 310; 30; 20 MG/100ML; MG/100ML; MG/100ML; MG/100ML
75 INJECTION, SOLUTION INTRAVENOUS CONTINUOUS
Status: DISCONTINUED | OUTPATIENT
Start: 2019-01-04 | End: 2019-01-04 | Stop reason: HOSPADM

## 2019-01-04 RX ORDER — ROCURONIUM BROMIDE 10 MG/ML
INJECTION, SOLUTION INTRAVENOUS AS NEEDED
Status: DISCONTINUED | OUTPATIENT
Start: 2019-01-04 | End: 2019-01-04 | Stop reason: HOSPADM

## 2019-01-04 RX ADMIN — SODIUM CHLORIDE, SODIUM LACTATE, POTASSIUM CHLORIDE, AND CALCIUM CHLORIDE 200 ML/HR: 600; 310; 30; 20 INJECTION, SOLUTION INTRAVENOUS at 00:38

## 2019-01-04 RX ADMIN — LIDOCAINE HYDROCHLORIDE 60 MG: 20 INJECTION, SOLUTION EPIDURAL; INFILTRATION; INTRACAUDAL; PERINEURAL at 12:47

## 2019-01-04 RX ADMIN — SODIUM CHLORIDE, SODIUM LACTATE, POTASSIUM CHLORIDE, AND CALCIUM CHLORIDE 125 ML/HR: 600; 310; 30; 20 INJECTION, SOLUTION INTRAVENOUS at 15:50

## 2019-01-04 RX ADMIN — SUCCINYLCHOLINE CHLORIDE 160 MG: 20 INJECTION INTRAMUSCULAR; INTRAVENOUS at 12:47

## 2019-01-04 RX ADMIN — FENTANYL CITRATE 100 MCG: 50 INJECTION, SOLUTION INTRAMUSCULAR; INTRAVENOUS at 12:47

## 2019-01-04 RX ADMIN — PIPERACILLIN AND TAZOBACTAM 3.38 G: 3; .375 INJECTION, POWDER, FOR SOLUTION INTRAVENOUS at 12:58

## 2019-01-04 RX ADMIN — ONDANSETRON 4 MG: 2 INJECTION INTRAMUSCULAR; INTRAVENOUS at 12:57

## 2019-01-04 RX ADMIN — NEOSTIGMINE METHYLSULFATE 5 MG: 1 INJECTION INTRAVENOUS at 14:34

## 2019-01-04 RX ADMIN — SODIUM CHLORIDE, SODIUM LACTATE, POTASSIUM CHLORIDE, AND CALCIUM CHLORIDE 125 ML/HR: 600; 310; 30; 20 INJECTION, SOLUTION INTRAVENOUS at 11:38

## 2019-01-04 RX ADMIN — GLYCOPYRROLATE 1 MG: 0.2 INJECTION INTRAMUSCULAR; INTRAVENOUS at 14:34

## 2019-01-04 RX ADMIN — DEXAMETHASONE SODIUM PHOSPHATE 8 MG: 4 INJECTION, SOLUTION INTRA-ARTICULAR; INTRALESIONAL; INTRAMUSCULAR; INTRAVENOUS; SOFT TISSUE at 12:57

## 2019-01-04 RX ADMIN — ROCURONIUM BROMIDE 10 MG: 10 INJECTION, SOLUTION INTRAVENOUS at 12:47

## 2019-01-04 RX ADMIN — PIPERACILLIN AND TAZOBACTAM 3.38 G: 3; .375 INJECTION, POWDER, FOR SOLUTION INTRAVENOUS at 19:15

## 2019-01-04 RX ADMIN — KETAMINE HYDROCHLORIDE 25 MG: 10 INJECTION, SOLUTION INTRAMUSCULAR; INTRAVENOUS at 13:16

## 2019-01-04 RX ADMIN — SODIUM CHLORIDE, SODIUM LACTATE, POTASSIUM CHLORIDE, CALCIUM CHLORIDE: 600; 310; 30; 20 INJECTION, SOLUTION INTRAVENOUS at 14:51

## 2019-01-04 RX ADMIN — PROPOFOL 200 MG: 10 INJECTION, EMULSION INTRAVENOUS at 12:47

## 2019-01-04 RX ADMIN — PIPERACILLIN AND TAZOBACTAM 3.38 G: 3; .375 INJECTION, POWDER, FOR SOLUTION INTRAVENOUS at 06:21

## 2019-01-04 RX ADMIN — ROCURONIUM BROMIDE 25 MG: 10 INJECTION, SOLUTION INTRAVENOUS at 12:51

## 2019-01-04 RX ADMIN — Medication 10 ML: at 00:38

## 2019-01-04 RX ADMIN — KETAMINE HYDROCHLORIDE 25 MG: 10 INJECTION, SOLUTION INTRAMUSCULAR; INTRAVENOUS at 14:00

## 2019-01-04 RX ADMIN — SODIUM CHLORIDE, SODIUM LACTATE, POTASSIUM CHLORIDE, AND CALCIUM CHLORIDE 200 ML/HR: 600; 310; 30; 20 INJECTION, SOLUTION INTRAVENOUS at 06:20

## 2019-01-04 RX ADMIN — ROCURONIUM BROMIDE 5 MG: 10 INJECTION, SOLUTION INTRAVENOUS at 14:07

## 2019-01-04 RX ADMIN — ONDANSETRON 4 MG: 2 INJECTION INTRAMUSCULAR; INTRAVENOUS at 15:15

## 2019-01-04 RX ADMIN — MIDAZOLAM HYDROCHLORIDE 3 MG: 1 INJECTION, SOLUTION INTRAMUSCULAR; INTRAVENOUS at 12:41

## 2019-01-04 RX ADMIN — OXYCODONE AND ACETAMINOPHEN 1 TABLET: 5; 325 TABLET ORAL at 17:47

## 2019-01-04 RX ADMIN — ROCURONIUM BROMIDE 10 MG: 10 INJECTION, SOLUTION INTRAVENOUS at 13:38

## 2019-01-04 RX ADMIN — MIDAZOLAM HYDROCHLORIDE 2 MG: 1 INJECTION, SOLUTION INTRAMUSCULAR; INTRAVENOUS at 12:44

## 2019-01-04 RX ADMIN — OXYCODONE AND ACETAMINOPHEN 2 TABLET: 5; 325 TABLET ORAL at 21:42

## 2019-01-04 RX ADMIN — SODIUM CHLORIDE, SODIUM LACTATE, POTASSIUM CHLORIDE, CALCIUM CHLORIDE: 600; 310; 30; 20 INJECTION, SOLUTION INTRAVENOUS at 12:36

## 2019-01-04 RX ADMIN — Medication 10 ML: at 21:43

## 2019-01-04 RX ADMIN — HYDROMORPHONE HYDROCHLORIDE 0.5 MG: 2 INJECTION, SOLUTION INTRAMUSCULAR; INTRAVENOUS; SUBCUTANEOUS at 13:41

## 2019-01-04 RX ADMIN — SODIUM CHLORIDE, SODIUM LACTATE, POTASSIUM CHLORIDE, AND CALCIUM CHLORIDE 125 ML/HR: 600; 310; 30; 20 INJECTION, SOLUTION INTRAVENOUS at 21:41

## 2019-01-04 RX ADMIN — DOCUSATE SODIUM 100 MG: 100 CAPSULE, LIQUID FILLED ORAL at 17:47

## 2019-01-04 RX ADMIN — Medication 10 ML: at 06:28

## 2019-01-04 RX ADMIN — HYDROMORPHONE HYDROCHLORIDE 0.5 MG: 2 INJECTION, SOLUTION INTRAMUSCULAR; INTRAVENOUS; SUBCUTANEOUS at 13:19

## 2019-01-04 NOTE — PROGRESS NOTES
Bedside shift change report given to Tomy (oncoming nurse) by Hyun Miguel (offgoing nurse). Report included the following information SBAR, Kardex and MAR.

## 2019-01-04 NOTE — PROGRESS NOTES
[facility] Surgery Subjective Doing well no acute issues, NPO Objective Patient Vitals for the past 24 hrs: 
 Temp Pulse Resp BP SpO2  
01/04/19 1143 99.2 °F (37.3 °C) 78 14 160/88 94 % 01/04/19 0950  80  159/85   
01/04/19 0830 98.5 °F (36.9 °C) 88 18 (!) 161/91 95 % 01/03/19 2349 98.1 °F (36.7 °C) 81 16 157/84 96 % 01/03/19 2222 98.4 °F (36.9 °C) (!) 105 18 155/85 96 % 01/03/19 2026 100.1 °F (37.8 °C) 84 18 170/78 91 % 01/03/19 2024 99.2 °F (37.3 °C) 84 16 (!) 180/99 94 % 01/03/19 1605 98.2 °F (36.8 °C) 60 18 149/82 97 % 01/03/19 1506  89 24 142/76 96 % 01/03/19 1458  88 14 (!) 136/91 96 % 01/03/19 1435  93 18 128/81 96 % 01/03/19 1430 99 °F (37.2 °C) 98 18 150/85 95 % 01/03/19 1314  88 18 170/89 94 % Date 01/03/19 0700 - 01/04/19 3457 01/04/19 0700 - 01/05/19 2191 Shift 2566-4108 2996-9198 24 Hour Total 8262-7105 8330-6018 24 Hour Total  
INTAKE  
I.V.(mL/kg/hr) 450(0.4)  450(0.2) Volume (lactated Ringers infusion) 450  450 Shift Total(mL/kg) 450(4.5)  450(4.5) OUTPUT Urine(mL/kg/hr) 350(0.3) 520(0.4) 870(0.4) Urine Voided 350 520 870 Shift Total(mL/kg) 350(3.5) 520(5.2) 870(8.7)  -520 -420 Weight (kg) 99.8 99.8 99.8 99.8 99.8 99.8 PE 
Pulm - CTAB 
CV - RRR Abd - soft, ND, BS Presnet, mild TTP Labs Recent Results (from the past 12 hour(s)) CBC W/O DIFF Collection Time: 01/04/19  6:25 AM  
Result Value Ref Range WBC 4.3 4.1 - 11.1 K/uL  
 RBC 4.86 4.10 - 5.70 M/uL  
 HGB 13.9 12.1 - 17.0 g/dL HCT 41.8 36.6 - 50.3 % MCV 86.0 80.0 - 99.0 FL  
 MCH 28.6 26.0 - 34.0 PG  
 MCHC 33.3 30.0 - 36.5 g/dL  
 RDW 12.5 11.5 - 14.5 % PLATELET 797 876 - 964 K/uL MPV 10.6 8.9 - 12.9 FL  
 NRBC 0.0 0  WBC ABSOLUTE NRBC 0.00 0.00 - 0.01 K/uL METABOLIC PANEL, COMPREHENSIVE  Collection Time: 01/04/19  6:25 AM  
 Result Value Ref Range Sodium 138 136 - 145 mmol/L Potassium 3.6 3.5 - 5.1 mmol/L Chloride 105 97 - 108 mmol/L  
 CO2 26 21 - 32 mmol/L Anion gap 7 5 - 15 mmol/L Glucose 94 65 - 100 mg/dL BUN 11 6 - 20 MG/DL Creatinine 0.82 0.70 - 1.30 MG/DL  
 BUN/Creatinine ratio 13 12 - 20 GFR est AA >60 >60 ml/min/1.73m2 GFR est non-AA >60 >60 ml/min/1.73m2 Calcium 8.6 8.5 - 10.1 MG/DL Bilirubin, total 1.9 (H) 0.2 - 1.0 MG/DL  
 ALT (SGPT) 492 (H) 12 - 78 U/L  
 AST (SGOT) 133 (H) 15 - 37 U/L Alk. phosphatase 191 (H) 45 - 117 U/L Protein, total 6.2 (L) 6.4 - 8.2 g/dL Albumin 2.8 (L) 3.5 - 5.0 g/dL Globulin 3.4 2.0 - 4.0 g/dL A-G Ratio 0.8 (L) 1.1 - 2.2 LIPASE Collection Time: 01/04/19  6:25 AM  
Result Value Ref Range Lipase 155 73 - 393 U/L Lee Irwin is a 66 y. o.yr old male with choledocholithiasis s/p ERCP and stenting Plan  
 
OR today for laparoscopic cholecystectomy Lfts trending down Stent will stay in place and repeat ERCP later in maybe 4 wks or so Cont IV abx DC home in the next day or two Ruth Ann Nazario MD

## 2019-01-04 NOTE — ROUTINE PROCESS
Patient had evening CHG bath/linen change, pain controlled at 2/10. Bedside shift change report given to Yehuda (oncoming nurse) by Maye Goltz RN (offgoing nurse). Report included the following information SBAR, Kardex, Procedure Summary, MAR and Recent Results.

## 2019-01-04 NOTE — PROGRESS NOTES
Bedside shift change report given to Ghazal Banegas RN (oncoming nurse) by Geovany Huff RN (offgoing nurse). Report included the following information SBAR, Kardex, Intake/Output and MAR.

## 2019-01-04 NOTE — PROCEDURES
[address]  [address]       NAME:  Karey Kocher   :   1955   MRN:   [id number]       Procedure Type:   ERCP with biliary sphincterotomy, biliary stone removal, biliary stent placement, biliary tissue sampling     Indications: abnormal CT/MRCP, common bile duct stone, biliary obstruction  Pre-operative Diagnosis: see indication above  Post-operative Diagnosis:  See findings below  : Lm Adjutant. Gladys Snowden MD    Referring Provider:    Amadou Cabrera MD    Sedation:  General anesthesia    Procedure Details:  After informed consent was obtained with all risks and benefits of procedure explained, the patient was taken to the fluoroscopy suite and placed in the prone position. Upon sequential sedation as per above, the Olympus duodenoscope [id number]   was inserted via the mouthpiece and carefully advanced to the second portion of the duodenum. The quality of visualization was good. The duodenoscope was withdrawn into a short position. Findings:   Esophagus:normal  Stomach: normal   Duodenum/: patchy duodenal bulb erosions. Normal appearing ampulla. ERCP: A  film was taken. The ampulla was normal appearing. Using a [employer] [id number] Dreamtome preloaded with a 0.035 inch Dreamwire, the bile duct was cannulated with the guidewire in the first attempt. The Dreamtome was then advanced over the guidewire. Bile was aspirated to confirm proper positioning. Limited contrast was injected under fluoroscopic visualization. The bile duct was dilated to approximately 13-14 mm upstream. The cystic duct take off filled with contrast but the gallbladder did not fill with contrast. A 12-13 mm stone was seen in the mid-CBD. In the distal common bile duct, a short 1 cm smooth appearing stricture was seen. A second 7-8 mm stone was seen. This was distal to the stricture. Next, a 9-10 mm biliary sphincterotomy was effected.  There was no post-sphincterotomy bleeding. Next, a 9 mm to 12 mm biliary extraction balloon was used to sweep the duct. The smaller stone was extracted. Next, attempt was made to advance a 2.0 cm trapezoid basket into the bile duct, but this could not traverse the strictured segment. Next, biliary brushings of the strictured segment were taken. Next, a 10 Fr by 7 cm plastic biliary stent was placed across the strictured segment and left in a transpapillary position. There was adequate drainage of contrast and bile. The pancreatic duct was neither entered or injected during this procedure. I performed all immediate radiologic interpretation during this procedure. Specimen Removed:  1. Biliary brushings    Complications: None. EBL:  None. Interventions:    Pancreatic: see above  Biliary: see above    Impression:    1. Choledocholithiasis - removal of smaller of two stones as above. Larger of two stones could not be removed due to biliary stricture  2. Distal common bile duct stricture - likely secondly to chronic stone passage. Biliary brushings obtained      Recommendations:      1. Watch for complications, including cholangitis, pancreatitis, bleeding, and perforation. 2. IV  cc/h overnight   3. PRN pain medication and anti-emetics  4. NPO for now. Clear liquid diet if patient is pain free. 5. If patient has progressive abdominal pain and/or change in abdominal exam, please check amylase, lipase, CBC, CMP, and upright KUB. 6. Follow up cytology  7. Timing of cholecystectomy per surgery  8. Plan for repeat ERCP in 4 weeks (following cholecystectomy). Discharge Disposition:  Hospital floor following recovery in Endoscopy    Mitchel Gonzales MD

## 2019-01-04 NOTE — PROGRESS NOTES
[facility] 
[facility], [address] GI PROGRESS NOTE Will Isak Parmar, [phone] office [phone] NP/PA in-hospital cell phone M-F until 4:30PM 
After 5PM or on weekends, please call  for physician on call NAME: Deng Sidhu :  1955 MRN:  [id number] Subjective:  
Patient underwent laparoscopic cholecystectomy and has some complaints of postoperative pain. No nausea or vomiting. Wife is at the bedside. Objective: VITALS:  
Last 24hrs VS reviewed since prior progress note. Most recent are: 
Visit Vitals BP (!) 150/97 (BP 1 Location: Right arm, BP Patient Position: At rest) Pulse 92 Temp 98.2 °F (36.8 °C) Resp 16 Ht 5' 10\" (1.778 m) Wt 99.8 kg (220 lb 0.3 oz) SpO2 98% BMI 31.57 kg/m² PHYSICAL EXAM: 
General: Cooperative, no acute distress   
Neurologic:  Alert and oriented HEENT: EOMI, no scleral icterus Lungs:  CTA bilaterally anteriorly Heart:  S1 S2 Abdomen: Soft, mildly distended, appropriate tenderness, no guarding, no rebound. +Bowel sounds. Incisions clean, dry, and intact. Drain in place. Extremities: Warm Psych:   Not anxious or agitated Lab Data Reviewed:  
 
Recent Results (from the past 24 hour(s)) CBC W/O DIFF Collection Time: 19  6:25 AM  
Result Value Ref Range WBC 4.3 4.1 - 11.1 K/uL  
 RBC 4.86 4.10 - 5.70 M/uL  
 HGB 13.9 12.1 - 17.0 g/dL HCT 41.8 36.6 - 50.3 % MCV 86.0 80.0 - 99.0 FL  
 MCH 28.6 26.0 - 34.0 PG  
 MCHC 33.3 30.0 - 36.5 g/dL  
 RDW 12.5 11.5 - 14.5 % PLATELET 433 355 - 134 K/uL MPV 10.6 8.9 - 12.9 FL  
 NRBC 0.0 0  WBC ABSOLUTE NRBC 0.00 0.00 - 0.01 K/uL METABOLIC PANEL, COMPREHENSIVE Collection Time: 19  6:25 AM  
Result Value Ref Range Sodium 138 136 - 145 mmol/L Potassium 3.6 3.5 - 5.1 mmol/L Chloride 105 97 - 108 mmol/L  
 CO2 26 21 - 32 mmol/L Anion gap 7 5 - 15 mmol/L Glucose 94 65 - 100 mg/dL BUN 11 6 - 20 MG/DL Creatinine 0.82 0.70 - 1.30 MG/DL  
 BUN/Creatinine ratio 13 12 - 20 GFR est AA >60 >60 ml/min/1.73m2 GFR est non-AA >60 >60 ml/min/1.73m2 Calcium 8.6 8.5 - 10.1 MG/DL Bilirubin, total 1.9 (H) 0.2 - 1.0 MG/DL  
 ALT (SGPT) 492 (H) 12 - 78 U/L  
 AST (SGOT) 133 (H) 15 - 37 U/L Alk. phosphatase 191 (H) 45 - 117 U/L Protein, total 6.2 (L) 6.4 - 8.2 g/dL Albumin 2.8 (L) 3.5 - 5.0 g/dL Globulin 3.4 2.0 - 4.0 g/dL A-G Ratio 0.8 (L) 1.1 - 2.2 LIPASE Collection Time: 01/04/19  6:25 AM  
Result Value Ref Range Lipase 155 73 - 393 U/L Assessment:  
· Epigastric abdominal pain: CT abdomen/pelvis with IV contrast (1/1/19). MRI/MRCP (1/2/19): 2 stones noted in the common bile duct with mid upstream dilatation of the common duct and intrahepatic ducts; small, contracted gallbladder filled with stones; no findings to suggest acute cholecystitis. · Choledocholithiasis: status post ERCP with sphincterotomy, biliary stone removal (larger stone could not be removed due to biliary stricture), stent placement, and tissue sampling (1/3/19). AST: 133 (261), ALT: 492 (671), alkaline phosphatase: 191 (196), total bilirubin: 1.9 (4.9, 7.8), lipase normal.  
· Cholecystitis: status post laparoscopic cholecystectomy (1/4/19) Patient Active Problem List  
Diagnosis Code  Calculus of bile duct with cholecystitis K80.40 Plan:  
· Postoperative management per surgery · Trend LFTs · Follow cytology of ERCP 
· Plan for repeat ERCP per Dr. Laureano Caldwell Signed By: ADAN Gabriel   
 1/4/2019  3:58 PM 
  
 
I have examined the patient. I have reviewed the chart and agree with the documentation recorded by the NP, including the assessment, treatment plan, and disposition. Will follow as needed this weekend Pramod Burger MD

## 2019-01-04 NOTE — OP NOTES
[facility] REPORT    Beak Serrano  MR#: [id number]  : 1955  ACCOUNT #: [de-identified]   DATE OF SERVICE: 2019    PREOPERATIVE DIAGNOSES:    1. Cholecystitis. 2.  Choledocholithiasis. POSTOPERATIVE DIAGNOSIS:  Cholecystitis. PROCEDURE PERFORMED:  Laparoscopic cholecystectomy. SURGEON:  Olga Burks MD    ASSISTANT:  Juan Daniel Lara SA     INDICATION FOR THE OPERATION:  The patient is a 82-year-old male who is postop day 1 status post ERCP for a common bile duct stone. Has a stent in place. Still has the 1 cm common bile duct stone still in the gallbladder that is going to be removed with an ERCP at a later time. Is now needing laparoscopic cholecystectomy. DESCRIPTION OF OPERATION:  The patient was met in the preop holding area. The H and P was updated. Consent was signed. All risks and benefits were explained to the patient prior to start of the operation. He was taken back to the operating room. He was lying in the supine position. The abdomen was prepped and draped in standard sterile fashion. Timeout was called. Antibiotics were given. SCDs were on the lower extremities. We started the operation by making a 5 mm incision in the right upper quadrant, inserting a Visiport trocar in intraabdominal cavity, insufflating to 15 mmHg. We then placed a 5 mm trocar superior to the umbilicus, a 12 mm port site just below the xiphoid and subxiphoid position and a 5 mm right upper quadrant port. We then took down some adhesions to the underside of the gallbladder and liver edge which were taken down with the Bovie cautery. We noticed that the gallbladder was very shrunken and very inflamed. There was a lot of fat that was surrounding it. We were able to dissect down on the gallbladder but it was very difficult. There was a lot of inflammation and bloody drainage from that area due to the dissection.   We dissected down to the area of the junction of the cystic duct and common bile duct. We could also see the hepatic duct as well. We noted that the cystic duct was almost nonexistent. It appeared that the gallbladder went almost directly into the common bile duct without much of a cystic duct. We dissected the gallbladder in dome down fashion to that junction. We could see all the way around. We did identify the cystic artery which we doubly clipped, ligated and divided. We felt that we had evaluated our anatomy very safely, identifying the hepatic duct, common bile duct and the gallbladder. We then used a 45 mm tan load Endo-COURTNEY stapler to divide the cystic duct/infundibulum of the gallbladder. We divided that with the 45 mm tan load stapler. We looked at our staple line which was performed correctly. We did not see any sign of any stent or anything like that in the staple line. There was no leakage of bile. We removed the gallbladder and its contents from the 12 mm EndoCatch bag. We suction irrigated out the right upper quadrant with 500 mL of saline irrigation. The gallbladder fossa was hemostatic. We placed Britta powder into the gallbladder fossa to maintain hemostasis. We placed a 19-Angolan round Kp drain into the right upper quadrant along the area of the division of the gallbladder and brought it out of the right upper quadrant 5 mm incision site. We then closed our 12 mm subxiphoid trocar site fascial defect with an interrupted figure-of-eight suture. We then desufflated the abdominal cavity, removed the trocars and closed the skin with 4-0 Monocryl and Dermabond to complete the operation. Dr. Boris Sosa was present and scrubbed during the entire operation. The counts were correct. ANESTHESIA:  General.    ESTIMATED BLOOD LOSS:  100 mL. SPECIMENS REMOVED:  Gallbladder. FINDINGS:  Severely inflamed gallbladder, divided the infundibulum of the gallbladder with the stapler.   SUZANNE placed in the right upper quadrant. COMPLICATIONS:  None. IMPLANTS:  None.       MD KAI Logan /   D: 01/04/2019 15:36     T: 01/04/2019 17:24  JOB #: [id number]

## 2019-01-04 NOTE — BRIEF OP NOTE
BRIEF OPERATIVE NOTE Date of Procedure: 1/4/2019 Preoperative Diagnosis: CHOLECYSTITIS, CHOLEDOCHOLITHIASIS Postoperative Diagnosis: CHOLECYSTITIS, CHOLEDOCHOLITHIASIS Procedure(s): LAPAROSCOPIC CHOLECYSTECTOMY Surgeon(s) and Role: 
   Hilda Kevin MD - Primary Surgical Staff: 
Circ-1: Aysha Cummings Circ-Relief: Belinda Palacios; Malgorzata Orantes Scrub Tech-1: Delmy Clemente Scrub RN-Relief: Kathy Coon RN Surg Asst-1: Ny Santiago Event Time In Time Out Incision Start 01/04/2019 1307 Incision Close 01/04/2019 1433 Anesthesia: General  
Estimated Blood Loss: 100cc Specimens:  
ID Type Source Tests Collected by Time Destination 1 : gallbladder Fresh Gallbladder  Maryann Cole MD 1/4/2019 1310 Pathology Findings: severely inflamed GB, divided with stapler, SUZANNE in RUQ Complications: none Implants: * No implants in log *

## 2019-01-04 NOTE — ROUTINE PROCESS
TRANSFER - IN REPORT: 
 
Verbal report received from [facility] Route on Lin Noyola  being received from [facility](unit) for ordered procedure Report consisted of patients Situation, Background, Assessment and  
Recommendations(SBAR). Information from the following report(s) SBAR was reviewed with the receiving nurse. Opportunity for questions and clarification was provided. Assessment completed upon patients arrival to unit and care assumed.

## 2019-01-04 NOTE — PERIOP NOTES
TRANSFER - OUT REPORT: 
 
Verbal report given to Lyubov(name) on Yesenia Rising  being transferred to 5E(unit) for routine post - op Report consisted of patients Situation, Background, Assessment and  
Recommendations(SBAR). Time Pre op antibiotic given:1258 Anesthesia Stop time: 2661 Bagley Present on Transfer to floor:no Order for Bagley on Chart:no Discharge Prescriptions with Chart:no Information from the following report(s) SBAR, OR Summary, Intake/Output, MAR and Cardiac Rhythm NSR was reviewed with the receiving nurse. Opportunity for questions and clarification was provided. Is the patient on 02? YES 
     L/Min 2 Is the patient on a monitor? NO Is the nurse transporting with the patient? NO Surgical Waiting Area notified of patient's transfer from PACU? YES The following personal items collected during your admission accompanied patient upon transfer:  
Dental Appliance: Dental Appliances: None Vision: Visual Aid: None Hearing Aid:   
Jewelry: Jewelry: Ring Clothing: Clothing: At bedside Other Valuables: Other Valuables: Angelita Monterroso, Cell Phone Valuables sent to safe:

## 2019-01-04 NOTE — ANESTHESIA PREPROCEDURE EVALUATION
Anesthetic History No history of anesthetic complications Review of Systems / Medical History Patient summary reviewed, nursing notes reviewed and pertinent labs reviewed Pulmonary Within defined limits Neuro/Psych Within defined limits Cardiovascular Hypertension: well controlled GI/Hepatic/Renal 
Within defined limits Endo/Other Obesity Other Findings Physical Exam 
 
Airway Mallampati: II 
TM Distance: > 6 cm Neck ROM: normal range of motion Mouth opening: Normal 
 
 Cardiovascular Regular rate and rhythm,  S1 and S2 normal,  no murmur, click, rub, or gallop Dental 
No notable dental hx Pulmonary Breath sounds clear to auscultation Abdominal 
GI exam deferred Other Findings Anesthetic Plan ASA: 2 Anesthesia type: general 
 
 
 
 
Induction: Intravenous Anesthetic plan and risks discussed with: Patient

## 2019-01-04 NOTE — ANESTHESIA POSTPROCEDURE EVALUATION
Procedure(s): LAPAROSCOPIC CHOLECYSTECTOMY. Anesthesia Post Evaluation Patient location during evaluation: PACU Patient participation: complete - patient participated Level of consciousness: awake and alert Pain management: adequate Airway patency: patent Anesthetic complications: no 
Cardiovascular status: acceptable Respiratory status: acceptable Hydration status: acceptable Comments: I have seen and evaluated the patient and is ready for discharge. Naomy Espinal MD 
 
Post anesthesia nausea and vomiting:  none Visit Vitals /90 Pulse 90 Temp 36.8 °C (98.3 °F) Resp 17 Ht 5' 10\" (1.778 m) Wt 99.8 kg (220 lb 0.3 oz) SpO2 94% BMI 31.57 kg/m²

## 2019-01-05 LAB
ALBUMIN SERPL-MCNC: 2.9 G/DL (ref 3.5–5)
ALBUMIN/GLOB SERPL: 0.9 {RATIO} (ref 1.1–2.2)
ALP SERPL-CCNC: 162 U/L (ref 45–117)
ALT SERPL-CCNC: 412 U/L (ref 12–78)
ANION GAP SERPL CALC-SCNC: 9 MMOL/L (ref 5–15)
AST SERPL-CCNC: 111 U/L (ref 15–37)
BILIRUB SERPL-MCNC: 1.8 MG/DL (ref 0.2–1)
BUN SERPL-MCNC: 13 MG/DL (ref 6–20)
BUN/CREAT SERPL: 14 (ref 12–20)
CALCIUM SERPL-MCNC: 8.3 MG/DL (ref 8.5–10.1)
CHLORIDE SERPL-SCNC: 102 MMOL/L (ref 97–108)
CO2 SERPL-SCNC: 26 MMOL/L (ref 21–32)
CREAT SERPL-MCNC: 0.93 MG/DL (ref 0.7–1.3)
ERYTHROCYTE [DISTWIDTH] IN BLOOD BY AUTOMATED COUNT: 12.5 % (ref 11.5–14.5)
GLOBULIN SER CALC-MCNC: 3.2 G/DL (ref 2–4)
GLUCOSE SERPL-MCNC: 129 MG/DL (ref 65–100)
HCT VFR BLD AUTO: 40.6 % (ref 36.6–50.3)
HGB BLD-MCNC: 13.8 G/DL (ref 12.1–17)
MCH RBC QN AUTO: 29.1 PG (ref 26–34)
MCHC RBC AUTO-ENTMCNC: 34 G/DL (ref 30–36.5)
MCV RBC AUTO: 85.7 FL (ref 80–99)
NRBC # BLD: 0 K/UL (ref 0–0.01)
NRBC BLD-RTO: 0 PER 100 WBC
PLATELET # BLD AUTO: 212 K/UL (ref 150–400)
PMV BLD AUTO: 10.7 FL (ref 8.9–12.9)
POTASSIUM SERPL-SCNC: 3.7 MMOL/L (ref 3.5–5.1)
PROT SERPL-MCNC: 6.1 G/DL (ref 6.4–8.2)
RBC # BLD AUTO: 4.74 M/UL (ref 4.1–5.7)
SODIUM SERPL-SCNC: 137 MMOL/L (ref 136–145)
WBC # BLD AUTO: 8.7 K/UL (ref 4.1–11.1)

## 2019-01-05 PROCEDURE — 74011250636 HC RX REV CODE- 250/636: Performed by: SURGERY

## 2019-01-05 PROCEDURE — 74011250637 HC RX REV CODE- 250/637: Performed by: SURGERY

## 2019-01-05 PROCEDURE — 80053 COMPREHEN METABOLIC PANEL: CPT

## 2019-01-05 PROCEDURE — 74011000258 HC RX REV CODE- 258: Performed by: SURGERY

## 2019-01-05 PROCEDURE — 65270000032 HC RM SEMIPRIVATE

## 2019-01-05 PROCEDURE — 36415 COLL VENOUS BLD VENIPUNCTURE: CPT

## 2019-01-05 PROCEDURE — 85027 COMPLETE CBC AUTOMATED: CPT

## 2019-01-05 RX ADMIN — OXYCODONE AND ACETAMINOPHEN 2 TABLET: 5; 325 TABLET ORAL at 17:11

## 2019-01-05 RX ADMIN — OXYCODONE AND ACETAMINOPHEN 2 TABLET: 5; 325 TABLET ORAL at 10:52

## 2019-01-05 RX ADMIN — OXYCODONE AND ACETAMINOPHEN 2 TABLET: 5; 325 TABLET ORAL at 01:50

## 2019-01-05 RX ADMIN — PIPERACILLIN AND TAZOBACTAM 3.38 G: 3; .375 INJECTION, POWDER, FOR SOLUTION INTRAVENOUS at 21:29

## 2019-01-05 RX ADMIN — Medication 10 ML: at 15:17

## 2019-01-05 RX ADMIN — CLONIDINE HYDROCHLORIDE 0.2 MG: 0.2 TABLET ORAL at 15:32

## 2019-01-05 RX ADMIN — CLONIDINE HYDROCHLORIDE 0.2 MG: 0.2 TABLET ORAL at 21:33

## 2019-01-05 RX ADMIN — Medication 10 ML: at 21:36

## 2019-01-05 RX ADMIN — OXYCODONE AND ACETAMINOPHEN 2 TABLET: 5; 325 TABLET ORAL at 06:30

## 2019-01-05 RX ADMIN — PIPERACILLIN AND TAZOBACTAM 3.38 G: 3; .375 INJECTION, POWDER, FOR SOLUTION INTRAVENOUS at 15:17

## 2019-01-05 RX ADMIN — SODIUM CHLORIDE, SODIUM LACTATE, POTASSIUM CHLORIDE, AND CALCIUM CHLORIDE 125 ML/HR: 600; 310; 30; 20 INJECTION, SOLUTION INTRAVENOUS at 21:31

## 2019-01-05 RX ADMIN — DOCUSATE SODIUM 100 MG: 100 CAPSULE, LIQUID FILLED ORAL at 17:11

## 2019-01-05 RX ADMIN — Medication 10 ML: at 06:31

## 2019-01-05 RX ADMIN — PIPERACILLIN AND TAZOBACTAM 3.38 G: 3; .375 INJECTION, POWDER, FOR SOLUTION INTRAVENOUS at 06:32

## 2019-01-05 RX ADMIN — DOCUSATE SODIUM 100 MG: 100 CAPSULE, LIQUID FILLED ORAL at 10:28

## 2019-01-05 RX ADMIN — OXYCODONE AND ACETAMINOPHEN 2 TABLET: 5; 325 TABLET ORAL at 21:34

## 2019-01-05 NOTE — PROGRESS NOTES
Progress Note Patient: Kye Owens MRN: [id number]  SSN: [id number] YOB: 1955  Age: 66 y.o. Sex: male Admit Date: 2019 
 
1 Day Post-Op Procedure:  Procedure(s): LAPAROSCOPIC CHOLECYSTECTOMY Subjective:  
 
Patient complaining of some soreness. Objective:  
 
Visit Vitals BP (!) 175/95 (BP 1 Location: Right arm, BP Patient Position: At rest) Pulse 90 Temp 98.6 °F (37 °C) Resp 18 Ht 5' 10\" (1.778 m) Wt 220 lb 0.3 oz (99.8 kg) SpO2 95% BMI 31.57 kg/m² Temp (24hrs), Av.6 °F (37 °C), Min:98 °F (36.7 °C), Max:99.4 °F (37.4 °C) Physical Exam:   
Gen- alert in NAd 
Lungs-CTA H-RRR Abd- soft and mildly distended. SUZANNE serous Data Review: images and reports reviewed Lab Review: All lab results for the last 24 hours reviewed. Recent Results (from the past 24 hour(s)) CBC W/O DIFF Collection Time: 19  2:05 AM  
Result Value Ref Range WBC 8.7 4.1 - 11.1 K/uL  
 RBC 4.74 4.10 - 5.70 M/uL  
 HGB 13.8 12.1 - 17.0 g/dL HCT 40.6 36.6 - 50.3 % MCV 85.7 80.0 - 99.0 FL  
 MCH 29.1 26.0 - 34.0 PG  
 MCHC 34.0 30.0 - 36.5 g/dL  
 RDW 12.5 11.5 - 14.5 % PLATELET 225 602 - 382 K/uL MPV 10.7 8.9 - 12.9 FL  
 NRBC 0.0 0  WBC ABSOLUTE NRBC 0.00 0.00 - 0.01 K/uL METABOLIC PANEL, COMPREHENSIVE Collection Time: 19  2:05 AM  
Result Value Ref Range Sodium 137 136 - 145 mmol/L Potassium 3.7 3.5 - 5.1 mmol/L Chloride 102 97 - 108 mmol/L  
 CO2 26 21 - 32 mmol/L Anion gap 9 5 - 15 mmol/L Glucose 129 (H) 65 - 100 mg/dL BUN 13 6 - 20 MG/DL Creatinine 0.93 0.70 - 1.30 MG/DL  
 BUN/Creatinine ratio 14 12 - 20 GFR est AA >60 >60 ml/min/1.73m2 GFR est non-AA >60 >60 ml/min/1.73m2 Calcium 8.3 (L) 8.5 - 10.1 MG/DL Bilirubin, total 1.8 (H) 0.2 - 1.0 MG/DL  
 ALT (SGPT) 412 (H) 12 - 78 U/L  
 AST (SGOT) 111 (H) 15 - 37 U/L Alk.  phosphatase 162 (H) 45 - 117 U/L  
 Protein, total 6.1 (L) 6.4 - 8.2 g/dL Albumin 2.9 (L) 3.5 - 5.0 g/dL Globulin 3.2 2.0 - 4.0 g/dL A-G Ratio 0.9 (L) 1.1 - 2.2 Assessment:  
 
Hospital Problems  Never Reviewed Codes Class Noted POA Calculus of bile duct with cholecystitis ICD-10-CM: K80.40 ICD-9-CM: 574.40  1/1/2019 Unknown Plan/Recommendations/Medical Decision Making: S/p Lap Azeb- difficult Doing well OOB and ambulate Monitor LFT Incentive spirometry- wean 02 Signed By: Nadya Bonilla MD   
 January 5, 2019

## 2019-01-05 NOTE — PROGRESS NOTES
Bedside shift change report given to Lorel Duverney, RN (oncoming nurse) by Daren Mata RN (offgoing nurse). Report included the following information SBAR, Kardex, Intake/Output and MAR.

## 2019-01-06 VITALS
BODY MASS INDEX: 31.5 KG/M2 | DIASTOLIC BLOOD PRESSURE: 89 MMHG | SYSTOLIC BLOOD PRESSURE: 150 MMHG | HEIGHT: 70 IN | TEMPERATURE: 98 F | HEART RATE: 90 BPM | WEIGHT: 220.02 LBS | OXYGEN SATURATION: 95 % | RESPIRATION RATE: 18 BRPM

## 2019-01-06 LAB
ALBUMIN SERPL-MCNC: 2.5 G/DL (ref 3.5–5)
ALBUMIN/GLOB SERPL: 0.8 {RATIO} (ref 1.1–2.2)
ALP SERPL-CCNC: 132 U/L (ref 45–117)
ALT SERPL-CCNC: 256 U/L (ref 12–78)
ANION GAP SERPL CALC-SCNC: 8 MMOL/L (ref 5–15)
AST SERPL-CCNC: 61 U/L (ref 15–37)
BILIRUB SERPL-MCNC: 1.8 MG/DL (ref 0.2–1)
BUN SERPL-MCNC: 8 MG/DL (ref 6–20)
BUN/CREAT SERPL: 11 (ref 12–20)
CALCIUM SERPL-MCNC: 8.4 MG/DL (ref 8.5–10.1)
CHLORIDE SERPL-SCNC: 102 MMOL/L (ref 97–108)
CO2 SERPL-SCNC: 27 MMOL/L (ref 21–32)
CREAT SERPL-MCNC: 0.72 MG/DL (ref 0.7–1.3)
ERYTHROCYTE [DISTWIDTH] IN BLOOD BY AUTOMATED COUNT: 12.4 % (ref 11.5–14.5)
GLOBULIN SER CALC-MCNC: 3.2 G/DL (ref 2–4)
GLUCOSE SERPL-MCNC: 95 MG/DL (ref 65–100)
HCT VFR BLD AUTO: 37.9 % (ref 36.6–50.3)
HGB BLD-MCNC: 12.7 G/DL (ref 12.1–17)
MCH RBC QN AUTO: 29.1 PG (ref 26–34)
MCHC RBC AUTO-ENTMCNC: 33.5 G/DL (ref 30–36.5)
MCV RBC AUTO: 86.9 FL (ref 80–99)
NRBC # BLD: 0 K/UL (ref 0–0.01)
NRBC BLD-RTO: 0 PER 100 WBC
PLATELET # BLD AUTO: 180 K/UL (ref 150–400)
PMV BLD AUTO: 10.7 FL (ref 8.9–12.9)
POTASSIUM SERPL-SCNC: 3.4 MMOL/L (ref 3.5–5.1)
PROT SERPL-MCNC: 5.7 G/DL (ref 6.4–8.2)
RBC # BLD AUTO: 4.36 M/UL (ref 4.1–5.7)
SODIUM SERPL-SCNC: 137 MMOL/L (ref 136–145)
WBC # BLD AUTO: 5.6 K/UL (ref 4.1–11.1)

## 2019-01-06 PROCEDURE — 74011250637 HC RX REV CODE- 250/637: Performed by: SURGERY

## 2019-01-06 PROCEDURE — 85027 COMPLETE CBC AUTOMATED: CPT

## 2019-01-06 PROCEDURE — 80053 COMPREHEN METABOLIC PANEL: CPT

## 2019-01-06 PROCEDURE — 74011250636 HC RX REV CODE- 250/636: Performed by: SURGERY

## 2019-01-06 PROCEDURE — 74011000258 HC RX REV CODE- 258: Performed by: SURGERY

## 2019-01-06 PROCEDURE — 36415 COLL VENOUS BLD VENIPUNCTURE: CPT

## 2019-01-06 RX ORDER — AMOXICILLIN AND CLAVULANATE POTASSIUM 875; 125 MG/1; MG/1
1 TABLET, FILM COATED ORAL 2 TIMES DAILY
Qty: 20 TAB | Refills: 0 | Status: SHIPPED | OUTPATIENT
Start: 2019-01-06 | End: 2019-01-16

## 2019-01-06 RX ORDER — OXYCODONE AND ACETAMINOPHEN 5; 325 MG/1; MG/1
1 TABLET ORAL
Qty: 20 TAB | Refills: 0 | Status: SHIPPED | OUTPATIENT
Start: 2019-01-06 | End: 2019-03-26

## 2019-01-06 RX ADMIN — OXYCODONE AND ACETAMINOPHEN 2 TABLET: 5; 325 TABLET ORAL at 04:26

## 2019-01-06 RX ADMIN — Medication 10 ML: at 04:25

## 2019-01-06 RX ADMIN — SODIUM CHLORIDE, SODIUM LACTATE, POTASSIUM CHLORIDE, AND CALCIUM CHLORIDE 125 ML/HR: 600; 310; 30; 20 INJECTION, SOLUTION INTRAVENOUS at 04:23

## 2019-01-06 RX ADMIN — PIPERACILLIN AND TAZOBACTAM 3.38 G: 3; .375 INJECTION, POWDER, FOR SOLUTION INTRAVENOUS at 04:24

## 2019-01-06 RX ADMIN — OXYCODONE AND ACETAMINOPHEN 2 TABLET: 5; 325 TABLET ORAL at 11:34

## 2019-01-06 RX ADMIN — DOCUSATE SODIUM 100 MG: 100 CAPSULE, LIQUID FILLED ORAL at 09:07

## 2019-01-06 NOTE — PROGRESS NOTES
Progress Note Patient: Dallas Solano MRN: [id number]  SSN: [id number] YOB: 1955  Age: 66 y.o. Sex: male Admit Date: 2019 2 Days Post-Op Procedure:  Procedure(s): LAPAROSCOPIC CHOLECYSTECTOMY Subjective:  
 
Patient feeling much better. Tolerating diet. Objective:  
 
Visit Vitals /89 (BP 1 Location: Right arm, BP Patient Position: At rest) Pulse 90 Temp 98 °F (36.7 °C) Resp 18 Ht 5' 10\" (1.778 m) Wt 220 lb 0.3 oz (99.8 kg) SpO2 95% BMI 31.57 kg/m² Temp (24hrs), Av.5 °F (36.9 °C), Min:98 °F (36.7 °C), Max:99.6 °F (37.6 °C) Physical Exam:   
Gen- Alert in NAD Abd- S/NT/ND Samir serous Data Review: images and reports reviewed Lab Review: All lab results for the last 24 hours reviewed. Recent Results (from the past 24 hour(s)) CBC W/O DIFF Collection Time: 19  4:15 AM  
Result Value Ref Range WBC 5.6 4.1 - 11.1 K/uL  
 RBC 4.36 4.10 - 5.70 M/uL  
 HGB 12.7 12.1 - 17.0 g/dL HCT 37.9 36.6 - 50.3 % MCV 86.9 80.0 - 99.0 FL  
 MCH 29.1 26.0 - 34.0 PG  
 MCHC 33.5 30.0 - 36.5 g/dL  
 RDW 12.4 11.5 - 14.5 % PLATELET 462 778 - 214 K/uL MPV 10.7 8.9 - 12.9 FL  
 NRBC 0.0 0  WBC ABSOLUTE NRBC 0.00 0.00 - 0.01 K/uL METABOLIC PANEL, COMPREHENSIVE Collection Time: 19  4:15 AM  
Result Value Ref Range Sodium 137 136 - 145 mmol/L Potassium 3.4 (L) 3.5 - 5.1 mmol/L Chloride 102 97 - 108 mmol/L  
 CO2 27 21 - 32 mmol/L Anion gap 8 5 - 15 mmol/L Glucose 95 65 - 100 mg/dL BUN 8 6 - 20 MG/DL Creatinine 0.72 0.70 - 1.30 MG/DL  
 BUN/Creatinine ratio 11 (L) 12 - 20 GFR est AA >60 >60 ml/min/1.73m2 GFR est non-AA >60 >60 ml/min/1.73m2 Calcium 8.4 (L) 8.5 - 10.1 MG/DL Bilirubin, total 1.8 (H) 0.2 - 1.0 MG/DL  
 ALT (SGPT) 256 (H) 12 - 78 U/L  
 AST (SGOT) 61 (H) 15 - 37 U/L Alk. phosphatase 132 (H) 45 - 117 U/L Protein, total 5.7 (L) 6.4 - 8.2 g/dL Albumin 2.5 (L) 3.5 - 5.0 g/dL Globulin 3.2 2.0 - 4.0 g/dL A-G Ratio 0.8 (L) 1.1 - 2.2 Assessment:  
 
Hospital Problems  Never Reviewed Codes Class Noted POA Calculus of bile duct with cholecystitis ICD-10-CM: K80.40 ICD-9-CM: 574.40  1/1/2019 Unknown Plan/Recommendations/Medical Decision Making: S/p Lap Azeb Lft's improving and patient feeling better Will discharge home Remove drain. Signed By: Giovanna Argueta MD   
 January 6, 2019

## 2019-01-06 NOTE — PROGRESS NOTES
Bedside shift change report given to Faith Guerrero RN (oncoming nurse) by Harper Carrillo RN (offgoing nurse). Report included the following information SBAR, Kardex, Intake/Output and MAR.

## 2019-01-06 NOTE — PROGRESS NOTES
1110 Patient drain d/c per MD order dry dressing over site. Patient educated with discharge instructions and Rx. Patient signed copy of the discharge instructions that were then placed on the hard chart.

## 2019-01-06 NOTE — DISCHARGE INSTRUCTIONS
Patient Education        Learning About Cholecystectomy  What is cholecystectomy  Cholecystectomy (say \"koh-fredy-sis-GLADYS-tuh-parrish\") is surgery to remove the gallbladder and gallstones. The gallbladder stores bile made by your liver. The bile helps you digest fats. Gallstones are made of cholesterol and other things found in bile. Your body will work fine without a gallbladder. Bile will go straight from the liver to the intestine. There may be small changes in how you digest food. But you probably will not notice them. How is the surgery done? Cholecystectomy is usually laparoscopic surgery. To do this type of surgery, a doctor puts a lighted tube, or scope, and other surgical tools through small cuts (incisions) in your belly. The doctor is able to see your organs with the scope. After your gallbladder is removed, you will no longer have gallstones. The cuts leave scars that usually fade with time. Open surgery may be done if problems are found during laparoscopic surgery. With open surgery, the gallbladder is removed through one larger cut in your belly. And the hospital stay is longer. What can you expect after surgery? It is normal to feel weak and tired for several days after you return home. Your belly may be swollen. If you had laparoscopic surgery, you may also have pain in your shoulder for about 24 hours. You may have gas or need to burp a lot at first.  A few people get diarrhea. The diarrhea usually goes away in 2 to 4 weeks. But it may last longer. How quickly you get better depends on which kind of surgery you had. For laparoscopic surgery, most people can go back to work or their normal routine in 1 to 2 weeks. It depends on the type of work you do and how you feel. If you have open surgery, it will probably take 4 to 6 weeks before you get back to your normal routine. Follow-up care is a key part of your treatment and safety.  Be sure to make and go to all appointments, and call your doctor if you are having problems. It's also a good idea to know your test results and keep a list of the medicines you take. Where can you learn more? Go to http://jimmy-sathya.info/. Enter N408 in the search box to learn more about \"Learning About Cholecystectomy. \"  Current as of: March 28, 2018  Content Version: 11.8  © 3300-0517 [employer]. Care instructions adapted under license by [employer] (which disclaims liability or warranty for this information). If you have questions about a medical condition or this instruction, always ask your healthcare professional. [place] [address] any warranty or liability for your use of this information. Patient Education        Learning About Acute Cholecystitis  What is cholecystitis? Cholecystitis (say \"koh-lih-sis-TY-tus\") is inflammation of the gallbladder. The gallbladder stores bile. Bile helps the body digest food. Normally, the bile flows from the gallbladder to the small intestine. A gallstone stuck in the cystic duct is most often the cause of sudden (acute) cholecystitis. The cystic duct is the tube that carries the bile out of the gallbladder. The gallstone blocks the bile from leaving the gallbladder. This results in an irritated and swollen gallbladder. The disease can also be caused by infection or trauma, such as an injury from a car accident. Cholecystitis has to be treated right away. You will probably have to go to the hospital. Surgery is the usual treatment. What are the symptoms? Symptoms include:  · Steady and severe pain in the upper right part of belly. This is the most common symptom. The pain can sometimes move to your back or right shoulder blade. It may last for more than 6 hours. · Nausea or vomiting. · A fever. How is it treated? The main way to treat this disease is surgery to remove the gallbladder.  This surgery can often be done through small cuts (incisions) in the belly. This is called a laparoscopic cholecystectomy. In some cases, you may need a more extensive surgery. You may need surgery as soon as possible. The doctor may try to reduce swelling and irritation in the gallbladder before removing it. You may be given fluids and antibiotics through an IV. You may also be given pain medicine. Follow-up care is a key part of your treatment and safety. Be sure to make and go to all appointments, and call your doctor if you are having problems. It's also a good idea to know your test results and keep a list of the medicines you take. Where can you learn more? Go to http://jimmy-sathya.info/. Enter T940 in the search box to learn more about \"Learning About Acute Cholecystitis. \"  Current as of: March 28, 2018  Content Version: 11.8  © 2757-2064 [employer]. Care instructions adapted under license by [employer] (which disclaims liability or warranty for this information). If you have questions about a medical condition or this instruction, always ask your healthcare professional. [address] any warranty or liability for your use of this information.

## 2019-01-23 ENCOUNTER — OFFICE VISIT (OUTPATIENT)
Dept: SURGERY | Age: 64
End: 2019-01-23

## 2019-01-23 VITALS
SYSTOLIC BLOOD PRESSURE: 148 MMHG | TEMPERATURE: 98.1 F | HEIGHT: 70 IN | RESPIRATION RATE: 20 BRPM | WEIGHT: 218 LBS | HEART RATE: 125 BPM | DIASTOLIC BLOOD PRESSURE: 94 MMHG | BODY MASS INDEX: 31.21 KG/M2 | OXYGEN SATURATION: 95 %

## 2019-01-23 DIAGNOSIS — K80.43 CALCULUS OF BILE DUCT WITH ACUTE CHOLECYSTITIS AND OBSTRUCTION: Primary | ICD-10-CM

## 2019-01-23 NOTE — PROGRESS NOTES
1. Have you been to the ER, urgent care clinic since your last visit? Hospitalized since your last visit? No 
 
2. Have you seen or consulted any other health care providers outside of the [address] [place] [address] since your last visit? Include any pap smears or colon screening.  No

## 2019-01-25 NOTE — PROGRESS NOTES
Subjective:  
  
Tracy Haynes is a 66 y.o. male presents for postop care 2 wk(s) following  laparoscopic cholecystectomy and ERCP with stent Марина Daily Appetite is good. Eating a regular diet. without difficulty. Bowel movements are regular. The patient is not having any pain. Марина Daily Pathology: cholecystitis Objective:  
 
Visit Vitals BP (!) 148/94 (BP 1 Location: Left arm, BP Patient Position: Sitting) Pulse (!) 125 Temp 98.1 °F (36.7 °C) (Oral) Resp 20 Ht 5' 10\" (1.778 m) Wt 218 lb (98.9 kg) SpO2 95% BMI 31.28 kg/m² General:  alert, cooperative, no distress, appears stated age Abdomen: soft, non-tender Incision:   healing well, no drainage, no erythema, no hernia, no seroma, no swelling, no dehiscence, incision well approximated Assessment: 1. Tracy Haynes is a 66 y.o. male who is s/p laparoscopic cholecystectomy with ERCP and stent Plan: 1. Pt is to increase activities as tolerated. Марина Daily 2. Doing well, he will see Dr Nila Beth soon to get the stent removed 3. Follow-up prn Mr. Cordoba has a reminder for a \"due or due soon\" health maintenance. I have asked that he contact his primary care provider for follow-up on this health maintenance.

## 2019-01-28 NOTE — DISCHARGE SUMMARY
Physician Discharge Summary     Patient ID:  Karey Kocher  [id number]  10 y.o.  1955    Admit Date: 1/1/2019    Discharge Date: 1/6/2019    Admission Diagnoses: Calculus of bile duct with cholecystitis    Discharge Diagnoses: Active Problems:    Calculus of bile duct with cholecystitis (1/1/2019)         Admission Condition: [address]    Discharge Condition: Good    Procedure(s):    1/4/2019 - Procedure(s):  [address] Course:   He was admitted with CBD stones and colecystitis. He had an ERCP with stent placement and they could not remove the stone completely. He then had a laparoscopic cholecystectomy in the OR and did well. He had a drain in place and was tolerating a regular diet on DC home and the SUZANNE was removed prior to DC home. Consults: Gastroenterology    Significant Diagnostic Studies: ERCP    Disposition: home    Patient Instructions:   Cannot display discharge medications since this patient is not currently admitted.       Activity: No heavy lifting for 2 weeks  Diet: Regular Diet  Wound Care: Keep wound clean and dry    Follow-up with Hui Flower MD in 2 week(s)  Follow-up tests/labs none    Signed:  Hui Flower MD  1/28/2019  2:36 PM

## 2019-03-26 ENCOUNTER — ANESTHESIA EVENT (OUTPATIENT)
Dept: ENDOSCOPY | Age: 64
End: 2019-03-26
Payer: COMMERCIAL

## 2019-03-26 ENCOUNTER — ANESTHESIA (OUTPATIENT)
Dept: ENDOSCOPY | Age: 64
End: 2019-03-26
Payer: COMMERCIAL

## 2019-03-26 ENCOUNTER — APPOINTMENT (OUTPATIENT)
Dept: GENERAL RADIOLOGY | Age: 64
End: 2019-03-26
Attending: INTERNAL MEDICINE
Payer: COMMERCIAL

## 2019-03-26 ENCOUNTER — HOSPITAL ENCOUNTER (OUTPATIENT)
Age: 64
Setting detail: OUTPATIENT SURGERY
Discharge: HOME | End: 2019-03-26
Attending: INTERNAL MEDICINE | Admitting: INTERNAL MEDICINE
Payer: COMMERCIAL

## 2019-03-26 VITALS
WEIGHT: 216 LBS | BODY MASS INDEX: 30.92 KG/M2 | DIASTOLIC BLOOD PRESSURE: 95 MMHG | HEIGHT: 70 IN | OXYGEN SATURATION: 94 % | RESPIRATION RATE: 25 BRPM | HEART RATE: 77 BPM | TEMPERATURE: 98.1 F | SYSTOLIC BLOOD PRESSURE: 149 MMHG

## 2019-03-26 PROCEDURE — 77030013992 HC SNR POLYP ENDOSC BSC -B: Performed by: INTERNAL MEDICINE

## 2019-03-26 PROCEDURE — 76040000008: Performed by: INTERNAL MEDICINE

## 2019-03-26 PROCEDURE — 77030009038 HC CATH BILI STN RTVR BSC -C: Performed by: INTERNAL MEDICINE

## 2019-03-26 PROCEDURE — 74011250636 HC RX REV CODE- 250/636

## 2019-03-26 PROCEDURE — 74011000250 HC RX REV CODE- 250

## 2019-03-26 PROCEDURE — 77030031656 HC FCPS ENDO GRSP DISP BSC -B: Performed by: INTERNAL MEDICINE

## 2019-03-26 PROCEDURE — 76060000033 HC ANESTHESIA 1 TO 1.5 HR: Performed by: INTERNAL MEDICINE

## 2019-03-26 PROCEDURE — 74011636320 HC RX REV CODE- 636/320: Performed by: INTERNAL MEDICINE

## 2019-03-26 PROCEDURE — 77030006969 HC BSKT BILI RTVR BSC -D: Performed by: INTERNAL MEDICINE

## 2019-03-26 PROCEDURE — 77030012596 HC SPHNTOM BILI BSC -E: Performed by: INTERNAL MEDICINE

## 2019-03-26 PROCEDURE — 77030008684 HC TU ET CUF COVD -B: Performed by: ANESTHESIOLOGY

## 2019-03-26 PROCEDURE — 77030007288 HC DEV LOK BILI BSC -A: Performed by: INTERNAL MEDICINE

## 2019-03-26 PROCEDURE — 74011250636 HC RX REV CODE- 250/636: Performed by: INTERNAL MEDICINE

## 2019-03-26 PROCEDURE — 74011250637 HC RX REV CODE- 250/637: Performed by: INTERNAL MEDICINE

## 2019-03-26 PROCEDURE — 77030026438 HC STYL ET INTUB CARD -A: Performed by: ANESTHESIOLOGY

## 2019-03-26 RX ORDER — LIDOCAINE HYDROCHLORIDE 20 MG/ML
INJECTION, SOLUTION EPIDURAL; INFILTRATION; INTRACAUDAL; PERINEURAL AS NEEDED
Status: DISCONTINUED | OUTPATIENT
Start: 2019-03-26 | End: 2019-03-26 | Stop reason: HOSPADM

## 2019-03-26 RX ORDER — EPINEPHRINE 0.1 MG/ML
1 INJECTION INTRACARDIAC; INTRAVENOUS
Status: DISCONTINUED | OUTPATIENT
Start: 2019-03-26 | End: 2019-03-26 | Stop reason: HOSPADM

## 2019-03-26 RX ORDER — GLYCOPYRROLATE 0.2 MG/ML
INJECTION INTRAMUSCULAR; INTRAVENOUS AS NEEDED
Status: DISCONTINUED | OUTPATIENT
Start: 2019-03-26 | End: 2019-03-26 | Stop reason: HOSPADM

## 2019-03-26 RX ORDER — ROCURONIUM BROMIDE 10 MG/ML
INJECTION, SOLUTION INTRAVENOUS AS NEEDED
Status: DISCONTINUED | OUTPATIENT
Start: 2019-03-26 | End: 2019-03-26 | Stop reason: HOSPADM

## 2019-03-26 RX ORDER — SODIUM CHLORIDE 9 MG/ML
100 INJECTION, SOLUTION INTRAVENOUS CONTINUOUS
Status: DISCONTINUED | OUTPATIENT
Start: 2019-03-26 | End: 2019-03-26 | Stop reason: HOSPADM

## 2019-03-26 RX ORDER — FLUMAZENIL 0.1 MG/ML
0.2 INJECTION INTRAVENOUS
Status: DISCONTINUED | OUTPATIENT
Start: 2019-03-26 | End: 2019-03-26 | Stop reason: HOSPADM

## 2019-03-26 RX ORDER — PROPOFOL 10 MG/ML
INJECTION, EMULSION INTRAVENOUS AS NEEDED
Status: DISCONTINUED | OUTPATIENT
Start: 2019-03-26 | End: 2019-03-26 | Stop reason: HOSPADM

## 2019-03-26 RX ORDER — MIDAZOLAM HYDROCHLORIDE 1 MG/ML
.25-1 INJECTION, SOLUTION INTRAMUSCULAR; INTRAVENOUS
Status: DISCONTINUED | OUTPATIENT
Start: 2019-03-26 | End: 2019-03-26 | Stop reason: HOSPADM

## 2019-03-26 RX ORDER — NEOSTIGMINE METHYLSULFATE 1 MG/ML
INJECTION INTRAVENOUS AS NEEDED
Status: DISCONTINUED | OUTPATIENT
Start: 2019-03-26 | End: 2019-03-26 | Stop reason: HOSPADM

## 2019-03-26 RX ORDER — DEXAMETHASONE SODIUM PHOSPHATE 4 MG/ML
INJECTION, SOLUTION INTRA-ARTICULAR; INTRALESIONAL; INTRAMUSCULAR; INTRAVENOUS; SOFT TISSUE AS NEEDED
Status: DISCONTINUED | OUTPATIENT
Start: 2019-03-26 | End: 2019-03-26 | Stop reason: HOSPADM

## 2019-03-26 RX ORDER — EPINEPHRINE 0.1 MG/ML
1 INJECTION INTRACARDIAC; INTRAVENOUS ONCE
Status: DISCONTINUED | OUTPATIENT
Start: 2019-03-26 | End: 2019-03-26 | Stop reason: HOSPADM

## 2019-03-26 RX ORDER — DEXTROMETHORPHAN/PSEUDOEPHED 2.5-7.5/.8
1.2 DROPS ORAL
Status: DISCONTINUED | OUTPATIENT
Start: 2019-03-26 | End: 2019-03-26 | Stop reason: HOSPADM

## 2019-03-26 RX ORDER — ONDANSETRON 2 MG/ML
INJECTION INTRAMUSCULAR; INTRAVENOUS AS NEEDED
Status: DISCONTINUED | OUTPATIENT
Start: 2019-03-26 | End: 2019-03-26 | Stop reason: HOSPADM

## 2019-03-26 RX ORDER — NALOXONE HYDROCHLORIDE 0.4 MG/ML
0.4 INJECTION, SOLUTION INTRAMUSCULAR; INTRAVENOUS; SUBCUTANEOUS
Status: DISCONTINUED | OUTPATIENT
Start: 2019-03-26 | End: 2019-03-26 | Stop reason: HOSPADM

## 2019-03-26 RX ORDER — ATROPINE SULFATE 0.1 MG/ML
0.5 INJECTION INTRAVENOUS
Status: DISCONTINUED | OUTPATIENT
Start: 2019-03-26 | End: 2019-03-26 | Stop reason: HOSPADM

## 2019-03-26 RX ORDER — DIPHENHYDRAMINE HYDROCHLORIDE 50 MG/ML
50 INJECTION, SOLUTION INTRAMUSCULAR; INTRAVENOUS ONCE
Status: DISCONTINUED | OUTPATIENT
Start: 2019-03-26 | End: 2019-03-26 | Stop reason: HOSPADM

## 2019-03-26 RX ORDER — SODIUM CHLORIDE, SODIUM LACTATE, POTASSIUM CHLORIDE, CALCIUM CHLORIDE 600; 310; 30; 20 MG/100ML; MG/100ML; MG/100ML; MG/100ML
25 INJECTION, SOLUTION INTRAVENOUS CONTINUOUS
Status: DISCONTINUED | OUTPATIENT
Start: 2019-03-26 | End: 2019-03-26 | Stop reason: HOSPADM

## 2019-03-26 RX ORDER — FENTANYL CITRATE 50 UG/ML
100 INJECTION, SOLUTION INTRAMUSCULAR; INTRAVENOUS
Status: DISCONTINUED | OUTPATIENT
Start: 2019-03-26 | End: 2019-03-26 | Stop reason: HOSPADM

## 2019-03-26 RX ORDER — ATROPINE SULFATE 1 MG/ML
1 INJECTION, SOLUTION INTRAVENOUS ONCE
Status: DISCONTINUED | OUTPATIENT
Start: 2019-03-26 | End: 2019-03-26 | Stop reason: HOSPADM

## 2019-03-26 RX ORDER — SUCCINYLCHOLINE CHLORIDE 20 MG/ML
INJECTION INTRAMUSCULAR; INTRAVENOUS AS NEEDED
Status: DISCONTINUED | OUTPATIENT
Start: 2019-03-26 | End: 2019-03-26 | Stop reason: HOSPADM

## 2019-03-26 RX ORDER — SODIUM CHLORIDE 0.9 % (FLUSH) 0.9 %
5-40 SYRINGE (ML) INJECTION AS NEEDED
Status: DISCONTINUED | OUTPATIENT
Start: 2019-03-26 | End: 2019-03-26 | Stop reason: HOSPADM

## 2019-03-26 RX ORDER — PHENYLEPHRINE HYDROCHLORIDE 10 MG/ML
INJECTION INTRAVENOUS
Status: DISCONTINUED
Start: 2019-03-26 | End: 2019-03-26 | Stop reason: HOSPADM

## 2019-03-26 RX ORDER — SODIUM CHLORIDE 0.9 % (FLUSH) 0.9 %
5-40 SYRINGE (ML) INJECTION EVERY 8 HOURS
Status: DISCONTINUED | OUTPATIENT
Start: 2019-03-26 | End: 2019-03-26 | Stop reason: HOSPADM

## 2019-03-26 RX ADMIN — SIMETHICONE 80 MG: 63.3; 3.7 SOLUTION/ DROPS ORAL at 15:24

## 2019-03-26 RX ADMIN — ONDANSETRON 4 MG: 2 INJECTION INTRAMUSCULAR; INTRAVENOUS at 14:59

## 2019-03-26 RX ADMIN — DEXAMETHASONE SODIUM PHOSPHATE 8 MG: 4 INJECTION, SOLUTION INTRA-ARTICULAR; INTRALESIONAL; INTRAMUSCULAR; INTRAVENOUS; SOFT TISSUE at 14:59

## 2019-03-26 RX ADMIN — IOPAMIDOL 10 ML: 612 INJECTION, SOLUTION INTRAVENOUS at 14:14

## 2019-03-26 RX ADMIN — SODIUM CHLORIDE, POTASSIUM CHLORIDE, SODIUM LACTATE AND CALCIUM CHLORIDE: 600; 310; 30; 20 INJECTION, SOLUTION INTRAVENOUS at 14:21

## 2019-03-26 RX ADMIN — GLYCOPYRROLATE 0.6 MG: 0.2 INJECTION INTRAMUSCULAR; INTRAVENOUS at 15:41

## 2019-03-26 RX ADMIN — ROCURONIUM BROMIDE 5 MG: 10 INJECTION, SOLUTION INTRAVENOUS at 14:49

## 2019-03-26 RX ADMIN — PROPOFOL 100 MG: 10 INJECTION, EMULSION INTRAVENOUS at 15:04

## 2019-03-26 RX ADMIN — LIDOCAINE HYDROCHLORIDE 60 MG: 20 INJECTION, SOLUTION EPIDURAL; INFILTRATION; INTRACAUDAL; PERINEURAL at 14:49

## 2019-03-26 RX ADMIN — NEOSTIGMINE METHYLSULFATE 3 MG: 1 INJECTION INTRAVENOUS at 15:41

## 2019-03-26 RX ADMIN — SUCCINYLCHOLINE CHLORIDE 160 MG: 20 INJECTION INTRAMUSCULAR; INTRAVENOUS at 14:50

## 2019-03-26 RX ADMIN — PROPOFOL 200 MG: 10 INJECTION, EMULSION INTRAVENOUS at 14:49

## 2019-03-26 RX ADMIN — ROCURONIUM BROMIDE 15 MG: 10 INJECTION, SOLUTION INTRAVENOUS at 15:04

## 2019-03-26 NOTE — ROUTINE PROCESS
[facility] 1955 
[id number] Situation: 
Verbal report received from: JOHANNA Cardenas Procedure: Procedure(s): ENDOSCOPIC RETROGRADE CHOLANGIOPANCREATOGRAPHY (ERCP) WTIH STENT REMOVAL 
REMOVAL OF FOREIGN BODY 
ENDOSCOPIC STONE EXTRACTION/BALLOON SWEEP 
BILIARY LITHOTRIPSY Background: 
 
Preoperative diagnosis: COMMON BILE DUCT STONE Postoperative diagnosis: 1. - Common Bile Duct :  Dr. Rene White Assistant(s): Endoscopy Technician-1: Annabella Gonzalez Endoscopy RN-1: Abbey Reed RN Specimens: * No specimens in log * H. Pylori  no Assessment: 
Intra-procedure medications Anesthesia gave intra-procedure sedation and medications, see anesthesia flow sheet yes Intravenous fluids:  1000 LR   @ Acquanetta Liu Vital signs stable yes Abdominal assessment: round and soft  yes Recommendation: 
Discharge patient per MD order . Return to floor no Family or Friend : wife Permission to share finding with family or friend yes

## 2019-03-26 NOTE — PROGRESS NOTES

## 2019-03-26 NOTE — PROCEDURES
[address]  [address]       NAME:  Dallas Solano   :   1955   MRN:   [id number]       Procedure Type:   ERCP with biliary stent removal, biliary stone extraction, mechanical lithotripsy    Indications: choledocholithiasis  Pre-operative Diagnosis: see indication above  Post-operative Diagnosis:  See findings below  : Zaid Joya. Cesar Jones MD    Referring Provider:  Azul King MD    Sedation:  General anesthesia    Procedure Details:  After informed consent was obtained with all risks and benefits of procedure explained, the patient was taken to the fluoroscopy suite and placed in the prone position. Upon sequential sedation as per above, the Olympus duodenoscope  was inserted via the mouthpiece and carefully advanced to the second portion of the duodenum. The quality of visualization was adequate. The duodenoscope was withdrawn into a short position. Findings:   Esophagus: normal  Stomach: normal  Duodenum: previously placed plastic stent was seen transpapillary. A yves-ampullary diverticulum was present. ERCP: A  film was taken. Surgical clips consistent with prior cholecystectomy were seen. The previously placed plastic biliary stent was seen fluoroscopically and endoscopically. The stent was removed with a snare. The ampulla was consistent with prior sphincterotomy. Using a [employer] WO46 Dreamtome preloaded with a 0.035 inch Dreamwire, the bile duct was cannulated with the guidewire in the first attempt. The Dreamtome was then advanced over the guidewire. Bile was aspirated to confirm proper positioning. Limited contrast was injected under fluoroscopic visualization. The bile duct was dilated upstream to approximately 9-10 mm. There were two filling defects in the bile duct consistent with stones. One stone was approximately 12 mm in diameter. The second was approximately 7-8 mm.  The previously appreciated biliary stricture had resolved. A 9 mm to 12 mm biliary extraction balloon was used to sweep the bile duct. The smaller stone was easily extracted. The extraction balloon was used to remove the larger stone, but this was not successful. Next, the 2.0 cm trapezoid basket was used to perform basket mechanical lithotripsy, which was successful. The stone was removed using a combination of the basket and the extraction balloon. Next, an occlusion cholangiogram was performed to confirm that there were no residual feeling defects. There was adequate drainage of contrast and bile. The pancreatic duct was neither entered or injected during this procedure. I performed all immediate radiologic interpretation during this procedure. Specimen Removed:  none    Complications: None. EBL:  None. Interventions:    Pancreatic: see above  Biliary: see above    Impression:   1. Removal of previously placed plastic biliary stent  2. Apparent resolution of previously appreciated distal common bile duct stricture (this may have been due to residual stone burden)  3. Removal of two stones using a combination of balloon extraction and mechanical basket lithotripsy    Recommendations:      1. Watch for complications, including cholangitis, pancreatitis, bleeding, and perforation. 2. IV LR in endoscopy recovery  3. PRN pain medication and anti-emetics  4. NPO for now. Clear liquid diet if patient is pain free. 5. Patient advised to call if he develops abdominal pain, fever, nausea or any concerning symptoms  6. No scheduled follow up is needed for this problem. Discharge Disposition:  home following recovery in Endoscopy    Signed By: Rubi Tolliver.  Arthur Patten MD     3/26/2019  3:47 PM

## 2019-03-26 NOTE — ANESTHESIA POSTPROCEDURE EVALUATION
Post-Anesthesia Evaluation and Assessment Patient: Kye Owens MRN: [id number]  SSN: [id number] YOB: 1955  Age: 66 y.o. Sex: male I have evaluated the patient and they are stable and ready for discharge from the PACU. Cardiovascular Function/Vital Signs Visit Vitals /82 Pulse 84 Temp 37 °C (98.6 °F) Resp 16 Ht 5' 10\" (1.778 m) Wt 98 kg (216 lb) SpO2 95% BMI 30.99 kg/m² Patient is status post General anesthesia for Procedure(s): ENDOSCOPIC RETROGRADE CHOLANGIOPANCREATOGRAPHY (ERCP) WTIH STENT REMOVAL 
REMOVAL OF FOREIGN BODY 
ENDOSCOPIC STONE EXTRACTION/BALLOON SWEEP 
BILIARY LITHOTRIPSY. Nausea/Vomiting: None Postoperative hydration reviewed and adequate. Pain: 
Pain Scale 1: Numeric (0 - 10) (03/26/19 1426) Pain Intensity 1: 0 (03/26/19 1426) Managed Neurological Status: At baseline Mental Status, Level of Consciousness: Alert and  oriented to person, place, and time Pulmonary Status:  
O2 Device: CO2 nasal cannula (03/26/19 1601) Adequate oxygenation and airway patent Complications related to anesthesia: None Post-anesthesia assessment completed. No concerns Signed By: Eric Mckeon MD   
 March 26, 2019 Procedure(s): ENDOSCOPIC RETROGRADE CHOLANGIOPANCREATOGRAPHY (ERCP) WTIH STENT REMOVAL 
REMOVAL OF FOREIGN BODY 
ENDOSCOPIC STONE EXTRACTION/BALLOON SWEEP 
BILIARY LITHOTRIPSY. general 
 
<BSHSIANPOST> Vitals Value Taken Time /81 3/26/2019  4:04 PM  
Temp Pulse 94 3/26/2019  4:07 PM  
Resp 16 3/26/2019  4:07 PM  
SpO2 94 % 3/26/2019  4:07 PM  
Vitals shown include unvalidated device data.

## 2019-03-26 NOTE — ANESTHESIA PREPROCEDURE EVALUATION
Relevant Problems No relevant active problems Anesthetic History No history of anesthetic complications Review of Systems / Medical History Patient summary reviewed, nursing notes reviewed and pertinent labs reviewed Pulmonary Within defined limits Neuro/Psych Within defined limits Cardiovascular Hypertension GI/Hepatic/Renal 
Within defined limits Endo/Other Within defined limits Other Findings Physical Exam 
 
Airway Mallampati: II 
TM Distance: > 6 cm Neck ROM: normal range of motion Mouth opening: Normal 
 
 Cardiovascular Regular rate and rhythm,  S1 and S2 normal,  no murmur, click, rub, or gallop Dental 
No notable dental hx Pulmonary Breath sounds clear to auscultation Abdominal 
GI exam deferred Other Findings Anesthetic Plan ASA: 2 Anesthesia type: general 
 
 
 
 
Induction: Intravenous Anesthetic plan and risks discussed with: Patient

## 2019-03-26 NOTE — H&P
[address] History and Physical    
 
NAME:  Denise Joseph :   1955 MRN:   [id number] History of Present Illness:  Patient is a 66 y.o. who is seen for common bile duct stone and biliary stricture. He presents for repeat ERCP for stent removal and stone extraction. PMH: 
Past Medical History:  
Diagnosis Date  Hypertension PSH: 
Past Surgical History:  
Procedure Laterality Date  HX CHOLECYSTECTOMY  2019 Dr. Cl Felix Allergies: 
No Known Allergies Home Medications: 
Prior to Admission Medications Prescriptions Last Dose Informant Patient Reported? Taking?  
oxyCODONE-acetaminophen (PERCOCET) 5-325 mg per tablet Not Taking at Unknown time  No No  
Sig: Take 1 Tab by mouth every four (4) hours as needed for Pain. Max Daily Amount: 6 Tabs. Facility-Administered Medications: None Hospital Medications: 
Current Facility-Administered Medications Medication Dose Route Frequency  0.9% sodium chloride infusion  100 mL/hr IntraVENous CONTINUOUS  
 sodium chloride (NS) flush 5-40 mL  5-40 mL IntraVENous Q8H  
 sodium chloride (NS) flush 5-40 mL  5-40 mL IntraVENous PRN  
 midazolam (VERSED) injection 0.25-10 mg  0.25-10 mg IntraVENous Multiple  fentaNYL citrate (PF) injection 100 mcg  100 mcg IntraVENous Multiple  naloxone (NARCAN) injection 0.4 mg  0.4 mg IntraVENous Multiple  flumazenil (ROMAZICON) 0.1 mg/mL injection 0.2 mg  0.2 mg IntraVENous Multiple  simethicone (MYLICON) 15DW/3.4SN oral drops 80 mg  1.2 mL Oral Multiple  diphenhydrAMINE (BENADRYL) injection 50 mg  50 mg IntraVENous ONCE  
 atropine injection 0.5 mg  0.5 mg IntraVENous ONCE PRN  
 EPINEPHrine (ADRENALIN) 0.1 mg/mL syringe 1 mg  1 mg Endoscopically ONCE PRN  
 glucagon (GLUCAGEN) injection 1 mg  1 mg IntraVENous ONCE  
 EPINEPHrine (ADRENALIN) 0.1 mg/mL syringe 1 mg  1 mg Other ONCE  
  atropine 1 mg/mL injection 1 mg  1 mg IntraVENous ONCE  
 lactated Ringers infusion  25 mL/hr IntraVENous CONTINUOUS  
 iopamidol (ISOVUE 300) 61 % contrast injection 30 mL  30 mL IntraCATHeter RAD ONCE  
 PHENYLephrine (DIYA-SYNEPHRINE) 10 mg/mL injection Social History: 
Social History Tobacco Use  Smoking status: Never Smoker  Smokeless tobacco: Never Used Substance Use Topics  Alcohol use: Yes Comment: 5 times a week Family History: 
History reviewed. No pertinent family history. Review of Systems: 
 
 
Constitutional: negative fever, negative chills, negative weight loss Eyes:   negative visual changes ENT:   negative sore throat, tongue or lip swelling Respiratory:  negative cough, negative dyspnea Cards:  negative for chest pain, palpitations, lower extremity edema GI:   See HPI 
:  negative for frequency, dysuria Integument:  negative for rash and pruritus Heme:  negative for easy bruising and gum/nose bleeding Musculoskel: negative for myalgias,  back pain and muscle weakness Neuro: negative for headaches, dizziness, vertigo Psych:  negative for feelings of anxiety, depression Objective:  
 
Patient Vitals for the past 8 hrs: 
 BP Temp Pulse Resp Height Weight  
03/26/19 1426 157/85 98.6 °F (37 °C) (!) 106 16 5' 10\" (1.778 m) 98 kg (216 lb) No intake/output data recorded. No intake/output data recorded. EXAM:   
 NEURO-a&o HEENT-wnl LUNGS-clear COR-regular rate and rhythym ABD-soft , no tenderness, no rebound, good bs EXT-no edema Data Review No results for input(s): WBC, HGB, HCT, PLT, HGBEXT, HCTEXT, PLTEXT in the last 72 hours. No results for input(s): NA, K, CL, CO2, BUN, CREA, GLU, PHOS, CA in the last 72 hours. No results for input(s): SGOT, GPT, AP, TBIL, TP, ALB, GLOB, GGT, AML, LPSE in the last 72 hours. No lab exists for component: AMYP, HLPSE No results for input(s): INR, PTP, APTT in the last 72 hours. No lab exists for component: INREXT Assessment:  
· Common bile duct stone · Biliary stricture Patient Active Problem List  
Diagnosis Code  Calculus of bile duct with cholecystitis K80.40 Plan:  
· Endoscopic procedure with GA Signed By: Rene Waller.  Swetha Rajan MD   
 3/26/2019  2:43 PM

## 2019-03-26 NOTE — DISCHARGE INSTRUCTIONS
[address]  [address] [facility], [address] [place] [address]    ERCP DISCHARGE INSTRUCTIONS    Ricardo Desouza  [id number]  1955    Discomfort:  Sore throat- throat lozenges or warm salt water gargle  redness at IV site- apply warm compress to area; if redness or soreness persist- contact your physician  Gaseous discomfort- walking, belching will help relieve any discomfort  You may not operate a vehicle for 12 hours  You may not engage in an occupation involving machinery or appliances for rest of today  You may not drink alcoholic beverages for at least 12 hours  Avoid making any critical decisions for at least 24 hour  DIET  You may resume a clear liquid diet for the remainder of today. If you are pain free, you may advance your diet to soft solids tomorrow. ACTIVITY  You may resume your normal daily activities   Spend the remainder of the day resting -  avoid any strenuous activity. CALL M.D. ANY SIGN OF   Increasing pain, nausea, vomiting  Abdominal distension (swelling)  New increased bleeding (oral or rectal)  Fever (chills)  Pain in chest area  Bloody discharge from nose or mouth  Shortness of breath    Follow-up Instructions:   Call Dr. Eneida Perez for any questions or problems. Telephone # [phone]    ENDOSCOPY FINDINGS:   Your previously placed plastic biliary stent was removed. The remaining bile duct stones were removed. If you develop a fever, abdominal pain, nausea, or any concerning symptoms, please call and/or go to the emergency department. If you recover well from this procedure, no scheduled follow up is needed for this issue. Please call if you have any questions. Signed By: Kemar Thompson MD     3/26/2019  4:21 PM

## 2022-03-18 PROBLEM — K80.40 CALCULUS OF BILE DUCT WITH CHOLECYSTITIS: Status: ACTIVE | Noted: 2019-01-01

## 2025-05-04 ENCOUNTER — HOSPITAL ENCOUNTER (EMERGENCY)
Facility: HOSPITAL | Age: 70
Discharge: HOME | End: 2025-05-04
Attending: EMERGENCY MEDICINE
Payer: COMMERCIAL

## 2025-05-04 VITALS
WEIGHT: 234.35 LBS | RESPIRATION RATE: 16 BRPM | BODY MASS INDEX: 33.55 KG/M2 | HEIGHT: 70 IN | OXYGEN SATURATION: 93 % | TEMPERATURE: 98.6 F | SYSTOLIC BLOOD PRESSURE: 189 MMHG | HEART RATE: 105 BPM | DIASTOLIC BLOOD PRESSURE: 103 MMHG

## 2025-05-04 DIAGNOSIS — Z20.9 EXPOSURE TO BAT WITHOUT KNOWN BITE: Primary | ICD-10-CM

## 2025-05-04 PROCEDURE — 90714 TD VACC NO PRESV 7 YRS+ IM: CPT | Performed by: EMERGENCY MEDICINE

## 2025-05-04 PROCEDURE — 99284 EMERGENCY DEPT VISIT MOD MDM: CPT

## 2025-05-04 PROCEDURE — 90472 IMMUNIZATION ADMIN EACH ADD: CPT | Performed by: EMERGENCY MEDICINE

## 2025-05-04 PROCEDURE — 6360000002 HC RX W HCPCS: Performed by: EMERGENCY MEDICINE

## 2025-05-04 PROCEDURE — 90471 IMMUNIZATION ADMIN: CPT | Performed by: EMERGENCY MEDICINE

## 2025-05-04 PROCEDURE — 96372 THER/PROPH/DIAG INJ SC/IM: CPT

## 2025-05-04 PROCEDURE — 90675 RABIES VACCINE IM: CPT | Performed by: EMERGENCY MEDICINE

## 2025-05-04 PROCEDURE — 90375 RABIES IG IM/SC: CPT | Performed by: EMERGENCY MEDICINE

## 2025-05-04 RX ADMIN — RABIES VACCINE 1 ML: KIT at 16:46

## 2025-05-04 RX ADMIN — CLOSTRIDIUM TETANI TOXOID ANTIGEN (FORMALDEHYDE INACTIVATED) AND CORYNEBACTERIUM DIPHTHERIAE TOXOID ANTIGEN (FORMALDEHYDE INACTIVATED) 0.5 ML: 5; 2 INJECTION, SUSPENSION INTRAMUSCULAR at 16:44

## 2025-05-04 RX ADMIN — RABIES IMMUNE GLOBULIN (HUMAN) 2130 UNITS: 300 INJECTION, SOLUTION INFILTRATION; INTRAMUSCULAR at 16:47

## 2025-05-04 ASSESSMENT — LIFESTYLE VARIABLES
HOW OFTEN DO YOU HAVE A DRINK CONTAINING ALCOHOL: MONTHLY OR LESS
HOW MANY STANDARD DRINKS CONTAINING ALCOHOL DO YOU HAVE ON A TYPICAL DAY: 1 OR 2

## 2025-05-04 ASSESSMENT — PAIN SCALES - GENERAL: PAINLEVEL_OUTOF10: 0

## 2025-05-04 NOTE — CARE COORDINATION
ED Case Management:     CM notified of consult for follow-up rabies information. CM spoke with patient via phone ([phone]) regarding follow-up. CM verified demographic information.  CM explained follow-up vaccines needed on Day 3- May 7, Day 7- May 11, and Day 14- May 18. CM discussed follow-up vaccine appointments can be made at [facility], administered at a pharmacy, or at [facility]. Encouraged patient to contact insurance provider for most cost-effective provider for patient. Patient requested appointment be made with [facility]. CM asked patient to bring the original prescription with him.     Prescription was scanned into chart. CM sent referral to [facility]  via e-mail.     BASHIR BARNETT

## 2025-05-04 NOTE — DISCHARGE INSTRUCTIONS
Routine appointments for health maintenance with a primary care provider are very important and emergency department visits are no substitute.  You should review all findings and test results from your visit today with your primary care physician.          Return to the emergency department for any new or concerning signs/symptoms or failure to improve.

## 2025-05-04 NOTE — ED NOTES
Patient given discharge papers and instructions by this RN. Patient verbalized understanding and stated not having questions or concerns regarding his care. Patient ambulatory out of ED in no new acute distress.

## 2025-05-04 NOTE — ED TRIAGE NOTES
Patient arrives with c/o possible bat bite. Patient states he doesn't think he got bitten. He woke up in the middle of the night and there was a bat flying over him. Patient states he called animal control and they caught the bat and is being tested for rabies, but wont know results until Thursday.

## 2025-05-04 NOTE — ED NOTES
Pt d/c'd home. No IV.  Pt had no c/o pain, pt only woke up in room with bat, does not know if bat bit him and has no pain at this time. Pt given rabies injections and tolerated well. Pt stayed 15 min after injections with no obvious rxn.

## 2025-05-04 NOTE — ED PROVIDER NOTES
[facility] EMERGENCY DEPARTMENT  EMERGENCY DEPARTMENT ENCOUNTER      Pt Name: Joey Lorenzo  MRN: [id number]  Birthdate 1955  Date of evaluation: 5/4/2025  Provider: Tera Camarillo DO    CHIEF COMPLAINT       Chief Complaint   Patient presents with    Bat Bite       ALLERGIES     Patient has no known allergies.    ENCOUNTER     HISTORY OF PRESENT ILLNESS:    A 69-year-old male presented to the Emergency Department after waking up this morning with a bat flying around his room. The history was provided by the patient himself. He reports no noticeable bites and denies any symptoms. The bat was captured by [employer], and testing is scheduled for later this week. The patient has no history of rabies vaccination. Symptoms are described as global and nonradiating.    PAST MEDICAL HISTORY:    - No history of rabies vaccination    PHYSICAL EXAM:    General Appearance: Alert and oriented, no acute distress.     HEENT: Normocephalic, atraumatic. No signs of trauma or infection.    Neck: Supple, no lymphadenopathy.    Respiratory: Clear to auscultation bilaterally, no wheezing or rales.    Cardiovascular: Regular rate and rhythm, no murmurs.    Abdomen: Soft, non-tender, no organomegaly.    Musculoskeletal: Normal range of motion, no tenderness or swelling.    Neurological: Alert and oriented, cranial nerves intact, no focal deficits.    Psychiatric: Cooperative, normal mood and affect.    SUMMARY:    A 69-year-old male presented with a chief complaint of a bat bite. The patient reported waking up with a bat in his room but observed no noticeable bites. He denied any symptoms and has no history of rabies vaccination. Rabies immunoglobulin and rabies vaccination were administered, and a prescription for further vaccination at an outpatient infusion center was provided. Case management was consulted to arrange outpatient vaccination. Return precautions were discussed with the patient. The patient was discharged

## 2025-05-04 NOTE — ED NOTES
This RN spoke with Selena from case management and made her aware of this patient's rabies series needed. Patient left with his prescription and this was scanned into the chart.

## 2025-05-05 PROBLEM — Z20.3 RABIES EXPOSURE: Status: ACTIVE | Noted: 2025-05-05

## 2025-05-05 NOTE — ED NOTES
Patient called ED for verification of medication and administration sites.  Patient stated that he had seen some information on the CDC website and was concerned about the injections. Reviewed MAR with patient.

## 2025-05-07 ENCOUNTER — HOSPITAL ENCOUNTER (OUTPATIENT)
Facility: HOSPITAL | Age: 70
Setting detail: INFUSION SERIES
Discharge: HOME | End: 2025-05-07
Payer: COMMERCIAL

## 2025-05-07 ENCOUNTER — HOSPITAL ENCOUNTER (OUTPATIENT)
Facility: HOSPITAL | Age: 70
Setting detail: INFUSION SERIES
End: 2025-05-07
Payer: COMMERCIAL

## 2025-05-07 VITALS
HEART RATE: 85 BPM | TEMPERATURE: 98.2 F | RESPIRATION RATE: 17 BRPM | SYSTOLIC BLOOD PRESSURE: 179 MMHG | DIASTOLIC BLOOD PRESSURE: 104 MMHG

## 2025-05-07 DIAGNOSIS — Z20.3 RABIES EXPOSURE: Primary | ICD-10-CM

## 2025-05-07 PROCEDURE — 90675 RABIES VACCINE IM: CPT | Performed by: EMERGENCY MEDICINE

## 2025-05-07 PROCEDURE — 90471 IMMUNIZATION ADMIN: CPT | Performed by: EMERGENCY MEDICINE

## 2025-05-07 PROCEDURE — 6360000002 HC RX W HCPCS: Performed by: EMERGENCY MEDICINE

## 2025-05-07 RX ADMIN — RABIES VACCINE 1 ML: KIT at 08:19

## 2025-05-07 ASSESSMENT — PAIN SCALES - GENERAL: PAINLEVEL_OUTOF10: 0

## 2025-05-07 NOTE — PROGRESS NOTES
[facility] Progress Note    Date: May 7, 2025    Name: Joey Lorenzo    MRN: [id number]         : 1955    Mr. Lorenzo Arrived ambulatory and in no distress for Rabies D3.        Mr. Lorenzo's vital signs for this visit.  Vitals:    25 0811   BP: (!) 179/104   Pulse: 85   Resp: 17   Temp: 98.2 °F (36.8 °C)        Medications given:   Medications Administered         rabies vaccine, PCEC (RABAVERT) injection 1 mL Admin Date  2025 Action  Given Dose  1 mL Route  IntraMUSCular Documented By  Kelli Spears RN        Given IM in the right deltoid    Mr. Lorenzo tolerated the injection, and had no complaints.    [facility].     Future Appointments   Date Time Provider Department Center   2025  8:00 AM [facility]   2025  8:00 AM [facility]       Kelli Spears RN  May 7, 2025  8:54 AM

## 2025-05-11 ENCOUNTER — HOSPITAL ENCOUNTER (OUTPATIENT)
Facility: HOSPITAL | Age: 70
Setting detail: INFUSION SERIES
Discharge: HOME | End: 2025-05-11
Payer: COMMERCIAL

## 2025-05-11 VITALS
SYSTOLIC BLOOD PRESSURE: 164 MMHG | DIASTOLIC BLOOD PRESSURE: 95 MMHG | HEART RATE: 84 BPM | OXYGEN SATURATION: 99 % | TEMPERATURE: 98 F

## 2025-05-11 DIAGNOSIS — Z20.3 RABIES EXPOSURE: Primary | ICD-10-CM

## 2025-05-11 PROCEDURE — 6360000002 HC RX W HCPCS: Performed by: EMERGENCY MEDICINE

## 2025-05-11 PROCEDURE — 90675 RABIES VACCINE IM: CPT | Performed by: EMERGENCY MEDICINE

## 2025-05-11 PROCEDURE — 90471 IMMUNIZATION ADMIN: CPT | Performed by: EMERGENCY MEDICINE

## 2025-05-11 RX ADMIN — RABIES VACCINE 1 ML: KIT at 07:49

## 2025-05-11 ASSESSMENT — PAIN SCALES - GENERAL: PAINLEVEL_OUTOF10: 0

## 2025-05-11 NOTE — PROGRESS NOTES
[facility] VISIT NOTE - Rabies Vaccine Series     0730 Patient arrives for Rabies Vaccine Day 7 without acute problems. Please see connect care for complete assessment and education provided.      Medications Administered         rabies vaccine, PCEC (RABAVERT) injection 1 mL Admin Date  05/11/2025 Action  Given Dose  1 mL Route  IntraMUSCular Documented By  Daysi Mcwilliams, RN             Vital signs stable throughout and prior to discharge. Patient discharged without incident. Patient/parent is aware of next OPI appointment on 5/12025. Appointment card given to parents.     VITAL SIGNS   Patient Vitals for the past 12 hrs:   Temp Pulse BP SpO2   05/11/25 0730 98 °F (36.7 °C) 84 (!) 164/95 99 %

## 2025-05-18 ENCOUNTER — HOSPITAL ENCOUNTER (OUTPATIENT)
Facility: HOSPITAL | Age: 70
Setting detail: INFUSION SERIES
Discharge: HOME | End: 2025-05-18
Payer: COMMERCIAL

## 2025-05-18 DIAGNOSIS — Z20.3 RABIES EXPOSURE: Primary | ICD-10-CM

## 2025-05-18 PROCEDURE — 90471 IMMUNIZATION ADMIN: CPT | Performed by: EMERGENCY MEDICINE

## 2025-05-18 PROCEDURE — 6360000002 HC RX W HCPCS: Performed by: EMERGENCY MEDICINE

## 2025-05-18 PROCEDURE — 90675 RABIES VACCINE IM: CPT | Performed by: EMERGENCY MEDICINE

## 2025-05-18 RX ADMIN — RABIES VACCINE 1 ML: KIT at 07:57

## 2025-05-18 NOTE — PROGRESS NOTES
[facility] Short Note                       Date: May 18, 2025    Name: Joey Lorenzo    MRN: [id number]         : 1955      Pt admit to [facility] for Rabie Vaccine ambulatory in stable condition.     Medications given: given in RIGHT deltoid  Medications Administered         rabies vaccine, PCEC (RABAVERT) injection 1 mL Admin Date  2025 Action  Given Dose  1 mL Route  IntraMUSCular Documented By  Kelli Bay RN            Mr. Lorenzo tolerated the injection and was discharged [facility] in stable condition.     Future Appointments   Date Time Provider Department Center   2025  8:00 AM [facility]ST[facility] 1 [facility]       Kelli Bay RN  May 18, 2025  7:57 AM

## (undated) DEVICE — SET ADMIN 16ML TBNG L100IN 2 Y INJ SITE IV PIGGY BK DISP

## (undated) DEVICE — INFECTION CONTROL KIT SYS

## (undated) DEVICE — KENDALL SCD EXPRESS SLEEVES, KNEE LENGTH, MEDIUM: Brand: KENDALL SCD

## (undated) DEVICE — APPLICATOR SURG L38CM RIG ATOMIZING SGL USE XL-R FLEXITIP

## (undated) DEVICE — 3000CC GUARDIAN II: Brand: GUARDIAN

## (undated) DEVICE — Device: Brand: SINGLE USE SOFT BRUSH

## (undated) DEVICE — TRNQT TEXT 1X18IN BLU LF DISP -- CONVERT TO ITEM 362165

## (undated) DEVICE — ENDO CARRY-ON PROCEDURE KIT INCLUDES ENZYMATIC SPONGE, GAUZE, BIOHAZARD LABEL, TRAY, LUBRICANT, DIRTY SCOPE LABEL, WATER LABEL, TRAY, DRAWSTRING PAD, AND DEFENDO 4-PIECE KIT.: Brand: ENDO CARRY-ON PROCEDURE KIT

## (undated) DEVICE — DEVICE LCK BILI RAP EXCHG OLPS --

## (undated) DEVICE — (D)PREP SKN CHLRAPRP APPL 26ML -- CONVERT TO ITEM 371833

## (undated) DEVICE — (D)FCPS GRSP 9MM 230CMLX2.4MM -- DISC BY MFR

## (undated) DEVICE — UNIVERSAL FIXATION CANNULA: Brand: VERSAONE

## (undated) DEVICE — BW-412T DISP COMBO CLEANING BRUSH: Brand: SINGLE USE COMBINATION CLEANING BRUSH

## (undated) DEVICE — BAG BELONG PT PERS CLEAR HANDL

## (undated) DEVICE — SYR 5ML 1/5 GRAD LL NSAF LF --

## (undated) DEVICE — FILTER SMK EVAC FLO CLR MEGADYNE

## (undated) DEVICE — REM POLYHESIVE ADULT PATIENT RETURN ELECTRODE: Brand: VALLEYLAB

## (undated) DEVICE — MEDI-VAC NON-CONDUCTIVE SUCTION TUBING: Brand: CARDINAL HEALTH

## (undated) DEVICE — TROCAR SITE CLOSURE DEVICE: Brand: ENDO CLOSE

## (undated) DEVICE — CANN NASAL O2 CAPNOGRAPHY AD -- FILTERLINE

## (undated) DEVICE — BITEBLOCK ENDOSCP 60FR MAXI WHT POLYETH STURDY W/ VELC WVN

## (undated) DEVICE — SUTURE SZ 0 27IN 5/8 CIR UR-6  TAPER PT VIOLET ABSRB VICRYL J603H

## (undated) DEVICE — KENDALL RADIOLUCENT FOAM MONITORING ELECTRODE -RECTANGULAR SHAPE: Brand: KENDALL

## (undated) DEVICE — SYR 3ML LL TIP 1/10ML GRAD --

## (undated) DEVICE — DEVON™ KNEE AND BODY STRAP 60" X 3" (1.5 M X 7.6 CM): Brand: DEVON

## (undated) DEVICE — Z DISCONTINUED NO SUB IDED SET EXTN W/ 4 W STPCOCK M SPIN LOK 36IN

## (undated) DEVICE — TUBING INSUFLTN 10FT LUER -- CONVERT TO ITEM 368568

## (undated) DEVICE — SOLIDIFIER FLUID 3000 CC ABSORB

## (undated) DEVICE — CATH IV AUTOGRD BC BLU 22GA 25 -- INSYTE

## (undated) DEVICE — NDL FLTR TIP 5 MIC 18GX1.5IN --

## (undated) DEVICE — SUTURE MCRYL SZ 4-0 L27IN ABSRB UD L19MM PS-2 1/2 CIR PRIM Y426H

## (undated) DEVICE — RETRIEVAL BALLOON CATHETER: Brand: EXTRACTOR™ PRO RX

## (undated) DEVICE — UNIVERSAL STAPLER: Brand: ENDO GIA ULTRA

## (undated) DEVICE — KIT COLON W/ 1.1OZ LUB AND 2 END

## (undated) DEVICE — APPLIER LIG CLP 5MM CONTAIN 16 TI CLP DISP ENDO CLP

## (undated) DEVICE — WIREGUIDED RETRIEVAL BASKET: Brand: TRAPEZOID RX

## (undated) DEVICE — PREP SKN CHLRAPRP SNGL 1.75ML --

## (undated) DEVICE — SNARE STIFF WIRE SHT THROW CRESC 27MM LOOP 240CM M SENS

## (undated) DEVICE — SPHINCTEROTOME: Brand: DREAMTOME™ RX 44

## (undated) DEVICE — SYR 50ML SLIP TIP NSAF LF STRL --

## (undated) DEVICE — 1200 GUARD II KIT W/5MM TUBE W/O VAC TUBE: Brand: GUARDIAN

## (undated) DEVICE — NEEDLE HYPO 22GA L1.5IN BLK S STL HUB POLYPR SHLD REG BVL

## (undated) DEVICE — NDL PRT INJ NSAF BLNT 18GX1.5 --

## (undated) DEVICE — STERILE POLYISOPRENE POWDER-FREE SURGICAL GLOVES WITH EMOLLIENT COATING: Brand: PROTEXIS

## (undated) DEVICE — SYR LR LCK 1ML GRAD NSAF 30ML --

## (undated) DEVICE — Device

## (undated) DEVICE — SURGICAL PROCEDURE KIT GEN LAPAROSCOPY LF

## (undated) DEVICE — WIREGUIDED CYTOLOGY BRUSH: Brand: RX CYTOLOGY BRUSH

## (undated) DEVICE — BLADELESS OPTICAL TROCAR WITH FIXATION CANNULA: Brand: VERSAPORT

## (undated) DEVICE — ELECTRODE ES 36CM LAP FLAT L HK COAT DISP CLEANCOAT

## (undated) DEVICE — ARTICULATION RELOAD WITH TRI-STAPLE TECHNOLOGY: Brand: ENDO GIA

## (undated) DEVICE — APPLICATOR BNDG 1MM ADH PREMIERPRO EXOFIN

## (undated) DEVICE — DRAPE,UTILTY,TAPE,15X26, 4EA/PK: Brand: MEDLINE

## (undated) DEVICE — CLICKLINE SCISSORS INSERT: Brand: CLICKLINE

## (undated) DEVICE — SPECIMEN RETRIEVAL POUCH: Brand: ENDO CATCH GOLD

## (undated) DEVICE — BLADELESS OPTICAL TROCAR WITH FIXATION CANNULA: Brand: VERSAONE

## (undated) DEVICE — AGENT HEMSTAT 3GM PURIFIED PLNT STARCH PWD ABSRB ARISTA AH